# Patient Record
Sex: FEMALE | Race: BLACK OR AFRICAN AMERICAN | NOT HISPANIC OR LATINO | Employment: UNEMPLOYED | URBAN - METROPOLITAN AREA
[De-identification: names, ages, dates, MRNs, and addresses within clinical notes are randomized per-mention and may not be internally consistent; named-entity substitution may affect disease eponyms.]

---

## 2017-10-29 ENCOUNTER — HOSPITAL ENCOUNTER (EMERGENCY)
Facility: HOSPITAL | Age: 37
Discharge: HOME | End: 2017-10-29
Attending: EMERGENCY MEDICINE
Payer: MEDICAID

## 2017-10-29 VITALS
WEIGHT: 190 LBS | TEMPERATURE: 98 F | HEIGHT: 60 IN | OXYGEN SATURATION: 99 % | RESPIRATION RATE: 20 BRPM | BODY MASS INDEX: 37.3 KG/M2 | HEART RATE: 74 BPM | DIASTOLIC BLOOD PRESSURE: 95 MMHG | SYSTOLIC BLOOD PRESSURE: 144 MMHG

## 2017-10-29 DIAGNOSIS — J02.9 SORE THROAT: Primary | ICD-10-CM

## 2017-10-29 DIAGNOSIS — H61.23 BILATERAL IMPACTED CERUMEN: ICD-10-CM

## 2017-10-29 PROCEDURE — 99283 EMERGENCY DEPT VISIT LOW MDM: CPT

## 2017-10-29 NOTE — ED PROVIDER NOTES
"SCRIBE #1 NOTE: I, Annette Wade, am scribing for, and in the presence of, Linda Jean MD. I have scribed the entire note.      History      Chief Complaint   Patient presents with    Sore Throat     Pt states, "I have a really bad sore throat and I can't swallow and it's making my ears hurt."       Review of patient's allergies indicates:  No Known Allergies     HPI   HPI    10/29/2017, 6:55 AM   History obtained from the patient      History of Present Illness: Mario Alberto Rader is a 37 y.o. female patient who presents to the Emergency Department for sore throat which onset gradually yesterday. Symptoms are constant and moderate in severity. No mitigating or exacerbating factors reported. No other associated sxs reported. Patient denies any fever, chills, n/v/d, rhinorrhea, congestion, otalgia, difficulty swallowing, sinus pressure, voice change, and all other sxs at this time. Prior Tx includes Ibuprofen and BC powder with no relief. No further complaints or concerns at this time.        Arrival mode: Personal vehicle      PCP: Coreen Woodard MD       Past Medical History:  History reviewed. No pertinent medical history.    Past Surgical History:  Past Surgical History:   Procedure Laterality Date    TUBAL LIGATION  2005         Family History:  Family History   Problem Relation Age of Onset    Hypertension Maternal Grandmother     Hypertension Mother     Migraines Mother     Hypertension Father     Diabetes Father     Rheum arthritis Father     Mental illness Sister      schizophrenia       Social History:  Social History     Social History Main Topics    Smoking status: Current Some Day Smoker     Packs/day: 0.00    Smokeless tobacco: unknown      Comment: social smoker - 1 only.  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    Alcohol use 1.8 oz/week     3 Glasses of wine per week    Drug use: Unknown    Sexual activity: unknown       ROS   Review of Systems   Constitutional: Negative " for chills and fever.   HENT: Positive for sore throat. Negative for congestion, ear pain, rhinorrhea, sinus pressure, trouble swallowing and voice change.    Respiratory: Negative for shortness of breath.    Cardiovascular: Negative for chest pain.   Gastrointestinal: Negative for diarrhea, nausea and vomiting.   Genitourinary: Negative for dysuria.   Musculoskeletal: Negative for back pain.   Skin: Negative for rash.   Neurological: Negative for weakness.   Hematological: Does not bruise/bleed easily.   All other systems reviewed and are negative.    Physical Exam      Initial Vitals   BP Pulse Resp Temp SpO2   -- -- -- -- --      MAP       --          Physical Exam  Nursing Notes and Vital Signs Reviewed.  Constitutional: Patient is in no acute distress. Well-developed and well-nourished.  Head: Atraumatic. Normocephalic.  Eyes: PERRL. EOM intact. Conjunctivae are not pale. No scleral icterus.  Ears: Cerumen in bilateral ears. Right TM normal. Left TM normal. No erythema. No bulging. No effusion or air-fluid levels. No perforation.   Nose: Patent nares. Turbinates are normal. No drainage.    Throat: Moist mucous membranes. Posterior oropharynx is symmetric without erythema. Tonsillar exudate is not present. No trismus. Normal handling of secretions. No stridor.  Neck: Supple. Full ROM. No lymphadenopathy.  Cardiovascular: Regular rate. Regular rhythm. No murmurs, rubs, or gallops. Distal pulses are 2+ and symmetric.  Pulmonary/Chest: No respiratory distress. Clear to auscultation bilaterally. No wheezing, rales, or rhonchi.  Abdominal: Soft and non-distended.  There is no tenderness.    Musculoskeletal: Moves all extremities. No obvious deformities. No edema.   Skin: Warm and dry.  Neurological:  Alert, awake, and appropriate.  Normal speech.  No acute focal neurological deficits are appreciated.  Psychiatric: Normal affect. Good eye contact. Appropriate in content.    ED Course    Procedures  ED Vital  Signs:  Vitals:    10/29/17 0655   BP: (!) 144/95   Pulse: 74   Resp: 20   Temp: 98 °F (36.7 °C)   TempSrc: Oral   SpO2: 99%   Weight: 86.2 kg (190 lb)   Height: 5' (1.524 m)                The Emergency Provider reviewed the vital signs and test results, which are outlined above.    ED Discussion     6:55 AM: Initial assessment of pt.  Pt is awake, alert, and in NAD at this time. Discussed with pt all pertinent ED information. Discussed pt dx and plan of tx. Gave pt all f/u and return to the ED instructions. All questions and concerns were addressed at this time. Pt expresses understanding of information and instructions, and is comfortable with plan to discharge. Pt is stable for discharge.      ED Medication(s):  Medications - No data to display    There are no discharge medications for this patient.      Follow-up Information     Coreen Woodard MD. Schedule an appointment as soon as possible for a visit in 2 days.    Specialty:  Internal Medicine  Why:  Return to the Emergency Room, If symptoms worsen  Contact information:  [address] [place] [id number]  [phone]                     Medical Decision Making              Scribe Attestation:   Scribe #1: I performed the above scribed service and the documentation accurately describes the services I performed. I attest to the accuracy of the note.    Attending:   Physician Attestation Statement for Scribe #1: I, Linda Jean MD, personally performed the services described in this documentation, as scribed by Annette Wade, in my presence, and it is both accurate and complete.          Clinical Impression       ICD-10-CM ICD-9-CM   1. Sore throat J02.9 462   2. Bilateral impacted cerumen H61.23 380.4       Disposition:   Disposition: Discharged  Condition: Stable         Linda Jean MD  10/29/17 0849

## 2017-12-26 ENCOUNTER — NURSE TRIAGE (OUTPATIENT)
Dept: ADMINISTRATIVE | Facility: CLINIC | Age: 37
End: 2017-12-26

## 2017-12-26 NOTE — TELEPHONE ENCOUNTER
Reason for Disposition   Vomiting and abdomen looks much more swollen than usual    Protocols used: ST ABDOMINAL PAIN - FEMALE-A-OH    Ms. Rader states she has abdominal pain with eating and drinking only that began 4 days ago. She states her abdomen in swollen, patient vomited yesterday. Patient advised to go to ED.

## 2020-12-10 ENCOUNTER — TELEPHONE (OUTPATIENT)
Dept: INTERNAL MEDICINE | Facility: CLINIC | Age: 40
End: 2020-12-10

## 2020-12-10 ENCOUNTER — TELEPHONE (OUTPATIENT)
Dept: PALLIATIVE MEDICINE | Facility: CLINIC | Age: 40
End: 2020-12-10

## 2020-12-10 NOTE — TELEPHONE ENCOUNTER
----- Message from Wenceslao Ding sent at 12/10/2020  9:24 AM CST -----  Hello,     The following patient is requesting an appt with Dr. Bowles due to abdominal pain. Explained to the pt she'll be considered a new pt since she was last seen in 2016 and no showed for appt in 2018. If possible, please contact the pt with an appt.     Thank you

## 2020-12-11 ENCOUNTER — OFFICE VISIT (OUTPATIENT)
Dept: INTERNAL MEDICINE | Facility: CLINIC | Age: 40
End: 2020-12-11
Payer: MEDICAID

## 2020-12-11 VITALS
SYSTOLIC BLOOD PRESSURE: 145 MMHG | DIASTOLIC BLOOD PRESSURE: 90 MMHG | HEIGHT: 60 IN | OXYGEN SATURATION: 99 % | WEIGHT: 180.75 LBS | HEART RATE: 87 BPM | BODY MASS INDEX: 35.49 KG/M2

## 2020-12-11 DIAGNOSIS — K21.9 GASTROESOPHAGEAL REFLUX DISEASE, UNSPECIFIED WHETHER ESOPHAGITIS PRESENT: Primary | ICD-10-CM

## 2020-12-11 PROCEDURE — 99203 PR OFFICE/OUTPT VISIT, NEW, LEVL III, 30-44 MIN: ICD-10-PCS | Mod: S$PBB,,, | Performed by: STUDENT IN AN ORGANIZED HEALTH CARE EDUCATION/TRAINING PROGRAM

## 2020-12-11 PROCEDURE — 99203 OFFICE O/P NEW LOW 30 MIN: CPT | Mod: S$PBB,,, | Performed by: STUDENT IN AN ORGANIZED HEALTH CARE EDUCATION/TRAINING PROGRAM

## 2020-12-11 PROCEDURE — 99999 PR PBB SHADOW E&M-EST. PATIENT-LVL III: CPT | Mod: PBBFAC,,, | Performed by: STUDENT IN AN ORGANIZED HEALTH CARE EDUCATION/TRAINING PROGRAM

## 2020-12-11 PROCEDURE — 99999 PR PBB SHADOW E&M-EST. PATIENT-LVL III: ICD-10-PCS | Mod: PBBFAC,,, | Performed by: STUDENT IN AN ORGANIZED HEALTH CARE EDUCATION/TRAINING PROGRAM

## 2020-12-11 PROCEDURE — 99213 OFFICE O/P EST LOW 20 MIN: CPT | Mod: PBBFAC | Performed by: STUDENT IN AN ORGANIZED HEALTH CARE EDUCATION/TRAINING PROGRAM

## 2020-12-11 RX ORDER — PANTOPRAZOLE SODIUM 40 MG/1
40 TABLET, DELAYED RELEASE ORAL DAILY
Qty: 43 TABLET | Refills: 0 | Status: SHIPPED | OUTPATIENT
Start: 2020-12-11 | End: 2021-05-06

## 2020-12-11 NOTE — PROGRESS NOTES
Internal Medicine Clinic Note  2020    Subjective   Patient Name: Mario Alberto Rader  : 1980    Chief Complaint:   Chief Complaint   Patient presents with    Nausea    Gastroesophageal Reflux    Emesis    Back Pain       History of Present Illness:  Ms. Mario Alberto Rader is a 40 y.o. female who presents with acid reflux, N/V, back pain. She reports 1 week of burning, tight, epigastric pain and severe acid reflux, making it hard to lie down or sleep. She has vomited x2, the last a couple days ago and noted a small amount of blood, although she also reports epistaxis then and believes it could be attributed to that as well. It is worse when she has not eaten and also when she eats certain foods, including spicy foods and EtOH, which she has tried to eliminate from her diet. It is associated with mid-left back pain/ muscle tightness, which she uses ibuprofen for. Marijuana helps. Midol helps bloating. She reports fluctuating constipation and diarrhea and tries to use benefiber, although sometimes causes diarrhea. She reports similar episode last year lasting 1 week, worse at that time as she was unable to eat. She did not use any medications for it and eventually subsided. She has tried Zantac, yonis seltzer, pepto-bismol, TUMS without improvement. Denies fever, chills.    Review of Systems   Constitutional: Negative for chills and fever.   HENT: Negative for sore throat.    Respiratory: Negative for cough and shortness of breath.    Cardiovascular: Negative for chest pain and leg swelling.   Gastrointestinal: Positive for abdominal pain, constipation, diarrhea, heartburn, nausea and vomiting.   Genitourinary: Negative for dysuria.   Musculoskeletal: Positive for back pain.   Skin: Negative for rash.   Neurological: Negative for dizziness and headaches.       PAST HISTORY:     No past medical history on file.    Past Surgical History:   Procedure Laterality Date    TUBAL LIGATION         Family History    Problem Relation Age of Onset    Hypertension Maternal Grandmother     Hypertension Mother     Migraines Mother     Hypertension Father     Diabetes Father     Rheum arthritis Father     Mental illness Sister         schizophrenia       Social History     Socioeconomic History    Marital status: Legally      Spouse name: Not on file    Number of children: Not on file    Years of education: Not on file    Highest education level: Not on file   Occupational History    Not on file   Social Needs    Financial resource strain: Not on file    Food insecurity     Worry: Not on file     Inability: Not on file    Transportation needs     Medical: Not on file     Non-medical: Not on file   Tobacco Use    Smoking status: Current Some Day Smoker     Packs/day: 0.00    Tobacco comment: social smoker - 1 only.  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   Substance and Sexual Activity    Alcohol use: Yes     Alcohol/week: 3.0 standard drinks     Types: 3 Glasses of wine per week    Drug use: Not on file    Sexual activity: Not on file   Lifestyle    Physical activity     Days per week: Not on file     Minutes per session: Not on file    Stress: Not on file   Relationships    Social connections     Talks on phone: Not on file     Gets together: Not on file     Attends Restoration service: Not on file     Active member of club or organization: Not on file     Attends meetings of clubs or organizations: Not on file     Relationship status: Not on file   Other Topics Concern    Not on file   Social History Narrative    Not on file       MEDICATIONS & ALLERGIES:       Current Outpatient Medications:     pantoprazole (PROTONIX) 40 MG tablet, Take 1 tablet (40 mg total) by mouth once daily., Disp: 43 tablet, Rfl: 0    Review of patient's allergies indicates:  No Known Allergies    OBJECTIVE:     Vital Signs:  Vitals:    12/11/20 1408   BP: (!) 145/90   BP Location: Right arm   Patient Position:  Sitting   BP Method: Large (Manual)   Pulse: 87   SpO2: 99%   Weight: 82 kg (180 lb 12.4 oz)   Height: 5' (1.524 m)       Body mass index is 35.31 kg/m².     Physical Exam  Constitutional:       Appearance: Normal appearance.   HENT:      Head: Normocephalic and atraumatic.      Mouth/Throat:      Mouth: Mucous membranes are moist.      Pharynx: Oropharynx is clear.   Eyes:      Extraocular Movements: Extraocular movements intact.      Pupils: Pupils are equal, round, and reactive to light.   Neck:      Musculoskeletal: Neck supple.   Cardiovascular:      Rate and Rhythm: Normal rate and regular rhythm.      Pulses: Normal pulses.      Heart sounds: Normal heart sounds.   Pulmonary:      Effort: Pulmonary effort is normal. No respiratory distress.      Breath sounds: Normal breath sounds.   Abdominal:      General: Abdomen is flat. Bowel sounds are normal. There is no distension.      Palpations: Abdomen is soft. There is no mass.      Tenderness: There is no guarding or rebound.      Comments: Mild tenderness to epigastrum and LUQ   Musculoskeletal:         General: No swelling.   Skin:     General: Skin is warm and dry.      Capillary Refill: Capillary refill takes less than 2 seconds.   Neurological:      General: No focal deficit present.      Mental Status: She is alert and oriented to person, place, and time. Mental status is at baseline.   Psychiatric:         Mood and Affect: Mood normal.         Behavior: Behavior normal.         Thought Content: Thought content normal.         Judgment: Judgment normal.         Laboratory  Lab Results   Component Value Date    WBC 3.67 (L) 03/14/2016    HGB 13.8 03/14/2016    HCT 40.2 03/14/2016    MCV 98 03/14/2016     03/14/2016     BMP  Lab Results   Component Value Date     03/14/2016    K 3.6 03/14/2016     03/14/2016    CO2 26 03/14/2016    BUN 7 03/14/2016    CREATININE 0.9 03/14/2016    CALCIUM 9.2 03/14/2016    ANIONGAP 10 03/14/2016     ESTGFRAFRICA >60.0 03/14/2016    EGFRNONAA >60.0 03/14/2016     No results found for: INR, PROTIME  Lab Results   Component Value Date    HGBA1C 4.5 03/14/2016     No results for input(s): POCTGLUCOSE in the last 72 hours.    Health Maintenance Due   Topic Date Due    Hepatitis C Screening  1980    HIV Screening  01/21/1997    TETANUS VACCINE  01/21/2000    Pneumococcal Vaccine (Medium Risk) (1 of 1 - PPSV23) 01/21/2001    Cervical Cancer Screening  03/14/2019    Influenza Vaccine (1) 08/01/2020       ASSESSMENT & PLAN:   Esther was seen today in clinic for acid reflux, N/V, back pain    Gastroesophageal reflux disease, unspecified whether esophagitis present  -- 1 week of burning, tight, epigastric pain and severe acid reflux, making it hard to lie down or sleep  -- vomited x2 and noted a small amount of blood, although she also reports epistaxis. Possibly epistaxis vs Ines Joy tear  -- Worse when she has not eaten and also when she eats certain foods, including spicy foods and EtOH, which she has tried to eliminate from her diet.  -- Counseled on food triggers and trying to determine which foods she should avoid  -- Using ibuprofen for associated back pain. Advised to avoid NSAIDs and use acetaminophen PRN pain  -- Fluctuating constipation and diarrhea and tries to use benefiber, although sometimes causes diarrhea. Discussed modifying dose to regulate bowels and avoid diarrhea  -- Similar episode last year lasting 1 week, eventually subsided.  -- pantoprazole 40 mg daily for 6 weeks  -- follow up if persists/worsens    RTC as needed.    Discussed with Dr. Craig  - staff attestation to follow    Nery Jones DO  Internal Medicine, PGY-1  [facility]  [address] [place] [id number] [phone]

## 2020-12-11 NOTE — PATIENT INSTRUCTIONS
-- pantoprazole daily for 6 weeks  -- stop ibuprofen  -- use acetaminophen over the counter for back pain  -- benefiber or metamucil for bowel regularity, may need to adjust dose to avoid recurrence of diarrhea  -- follow up if persists/worsens

## 2021-05-06 ENCOUNTER — HOSPITAL ENCOUNTER (EMERGENCY)
Facility: HOSPITAL | Age: 41
Discharge: HOME | End: 2021-05-06
Attending: EMERGENCY MEDICINE
Payer: MEDICAID

## 2021-05-06 VITALS
HEIGHT: 60 IN | SYSTOLIC BLOOD PRESSURE: 158 MMHG | BODY MASS INDEX: 35.3 KG/M2 | DIASTOLIC BLOOD PRESSURE: 92 MMHG | HEART RATE: 81 BPM | OXYGEN SATURATION: 97 % | RESPIRATION RATE: 16 BRPM | WEIGHT: 179.81 LBS | TEMPERATURE: 99 F

## 2021-05-06 DIAGNOSIS — R03.0 ELEVATED BLOOD PRESSURE READING: ICD-10-CM

## 2021-05-06 DIAGNOSIS — R07.9 CHEST PAIN: ICD-10-CM

## 2021-05-06 DIAGNOSIS — R05.9 COUGH: Primary | ICD-10-CM

## 2021-05-06 LAB
ALBUMIN SERPL BCP-MCNC: 4.5 G/DL (ref 3.5–5.2)
ALP SERPL-CCNC: 48 U/L (ref 55–135)
ALT SERPL W/O P-5'-P-CCNC: 25 U/L (ref 10–44)
ANION GAP SERPL CALC-SCNC: 8 MMOL/L (ref 8–16)
AST SERPL-CCNC: 27 U/L (ref 10–40)
BASOPHILS # BLD AUTO: 0.02 K/UL (ref 0–0.2)
BASOPHILS NFR BLD: 0.4 % (ref 0–1.9)
BILIRUB SERPL-MCNC: 0.7 MG/DL (ref 0.1–1)
BNP SERPL-MCNC: 15 PG/ML (ref 0–99)
BUN SERPL-MCNC: 4 MG/DL (ref 6–20)
CALCIUM SERPL-MCNC: 9.2 MG/DL (ref 8.7–10.5)
CHLORIDE SERPL-SCNC: 106 MMOL/L (ref 95–110)
CO2 SERPL-SCNC: 24 MMOL/L (ref 23–29)
CREAT SERPL-MCNC: 0.8 MG/DL (ref 0.5–1.4)
CTP QC/QA: YES
CTP QC/QA: YES
DIFFERENTIAL METHOD: ABNORMAL
EOSINOPHIL # BLD AUTO: 0 K/UL (ref 0–0.5)
EOSINOPHIL NFR BLD: 0.6 % (ref 0–8)
ERYTHROCYTE [DISTWIDTH] IN BLOOD BY AUTOMATED COUNT: 10.8 % (ref 11.5–14.5)
EST. GFR  (AFRICAN AMERICAN): >60 ML/MIN/1.73 M^2
EST. GFR  (NON AFRICAN AMERICAN): >60 ML/MIN/1.73 M^2
GLUCOSE SERPL-MCNC: 103 MG/DL (ref 70–110)
HCT VFR BLD AUTO: 41.8 % (ref 37–48.5)
HCV AB SERPL QL IA: NEGATIVE
HGB BLD-MCNC: 13.9 G/DL (ref 12–16)
HIV 1+2 AB+HIV1 P24 AG SERPL QL IA: NEGATIVE
IMM GRANULOCYTES # BLD AUTO: 0.01 K/UL (ref 0–0.04)
IMM GRANULOCYTES NFR BLD AUTO: 0.2 % (ref 0–0.5)
LYMPHOCYTES # BLD AUTO: 1.3 K/UL (ref 1–4.8)
LYMPHOCYTES NFR BLD: 25.6 % (ref 18–48)
MCH RBC QN AUTO: 32.8 PG (ref 27–31)
MCHC RBC AUTO-ENTMCNC: 33.3 G/DL (ref 32–36)
MCV RBC AUTO: 99 FL (ref 82–98)
MONOCYTES # BLD AUTO: 0.7 K/UL (ref 0.3–1)
MONOCYTES NFR BLD: 13.9 % (ref 4–15)
NEUTROPHILS # BLD AUTO: 2.9 K/UL (ref 1.8–7.7)
NEUTROPHILS NFR BLD: 59.3 % (ref 38–73)
NRBC BLD-RTO: 0 /100 WBC
PLATELET # BLD AUTO: 306 K/UL (ref 150–450)
PMV BLD AUTO: 9.3 FL (ref 9.2–12.9)
POC MOLECULAR INFLUENZA A AGN: NEGATIVE
POC MOLECULAR INFLUENZA B AGN: NEGATIVE
POTASSIUM SERPL-SCNC: 3.9 MMOL/L (ref 3.5–5.1)
PROT SERPL-MCNC: 8 G/DL (ref 6–8.4)
RBC # BLD AUTO: 4.24 M/UL (ref 4–5.4)
SARS-COV-2 RDRP RESP QL NAA+PROBE: NEGATIVE
SODIUM SERPL-SCNC: 138 MMOL/L (ref 136–145)
TROPONIN I SERPL DL<=0.01 NG/ML-MCNC: <0.006 NG/ML (ref 0–0.03)
WBC # BLD AUTO: 4.96 K/UL (ref 3.9–12.7)

## 2021-05-06 PROCEDURE — 86803 HEPATITIS C AB TEST: CPT | Performed by: EMERGENCY MEDICINE

## 2021-05-06 PROCEDURE — 80053 COMPREHEN METABOLIC PANEL: CPT | Performed by: NURSE PRACTITIONER

## 2021-05-06 PROCEDURE — 93010 EKG 12-LEAD: ICD-10-PCS | Mod: ,,, | Performed by: INTERNAL MEDICINE

## 2021-05-06 PROCEDURE — U0002 COVID-19 LAB TEST NON-CDC: HCPCS | Performed by: NURSE PRACTITIONER

## 2021-05-06 PROCEDURE — 36415 COLL VENOUS BLD VENIPUNCTURE: CPT | Performed by: EMERGENCY MEDICINE

## 2021-05-06 PROCEDURE — 93010 ELECTROCARDIOGRAM REPORT: CPT | Mod: ,,, | Performed by: INTERNAL MEDICINE

## 2021-05-06 PROCEDURE — 99285 EMERGENCY DEPT VISIT HI MDM: CPT | Mod: 25

## 2021-05-06 PROCEDURE — 93005 ELECTROCARDIOGRAM TRACING: CPT

## 2021-05-06 PROCEDURE — 86703 HIV-1/HIV-2 1 RESULT ANTBDY: CPT | Performed by: EMERGENCY MEDICINE

## 2021-05-06 PROCEDURE — 83880 ASSAY OF NATRIURETIC PEPTIDE: CPT | Performed by: NURSE PRACTITIONER

## 2021-05-06 PROCEDURE — 25000003 PHARM REV CODE 250: Performed by: NURSE PRACTITIONER

## 2021-05-06 PROCEDURE — 84484 ASSAY OF TROPONIN QUANT: CPT | Performed by: NURSE PRACTITIONER

## 2021-05-06 PROCEDURE — 85025 COMPLETE CBC W/AUTO DIFF WBC: CPT | Performed by: NURSE PRACTITIONER

## 2021-05-06 RX ORDER — CLONIDINE HYDROCHLORIDE 0.1 MG/1
0.1 TABLET ORAL
Status: COMPLETED | OUTPATIENT
Start: 2021-05-06 | End: 2021-05-06

## 2021-05-06 RX ORDER — AZITHROMYCIN 250 MG/1
250 TABLET, FILM COATED ORAL DAILY
Qty: 6 TABLET | Refills: 0 | Status: SHIPPED | OUTPATIENT
Start: 2021-05-06 | End: 2021-06-09

## 2021-05-06 RX ORDER — ACETAMINOPHEN/PYRILAMINE/CAFF 500-15-60
TABLET ORAL
COMMUNITY
End: 2024-03-26

## 2021-05-06 RX ADMIN — CLONIDINE HYDROCHLORIDE 0.1 MG: 0.1 TABLET ORAL at 12:05

## 2021-05-24 ENCOUNTER — LAB VISIT (OUTPATIENT)
Dept: INTERNAL MEDICINE | Facility: CLINIC | Age: 41
End: 2021-05-24
Payer: MEDICAID

## 2021-05-24 ENCOUNTER — OFFICE VISIT (OUTPATIENT)
Dept: INTERNAL MEDICINE | Facility: CLINIC | Age: 41
End: 2021-05-24
Payer: MEDICAID

## 2021-05-24 DIAGNOSIS — R10.9 ABDOMINAL PAIN, UNSPECIFIED ABDOMINAL LOCATION: ICD-10-CM

## 2021-05-24 DIAGNOSIS — K21.9 GASTROESOPHAGEAL REFLUX DISEASE, UNSPECIFIED WHETHER ESOPHAGITIS PRESENT: ICD-10-CM

## 2021-05-24 DIAGNOSIS — R05.9 COUGH: Primary | ICD-10-CM

## 2021-05-24 DIAGNOSIS — R05.9 COUGH: ICD-10-CM

## 2021-05-24 PROCEDURE — U0003 INFECTIOUS AGENT DETECTION BY NUCLEIC ACID (DNA OR RNA); SEVERE ACUTE RESPIRATORY SYNDROME CORONAVIRUS 2 (SARS-COV-2) (CORONAVIRUS DISEASE [COVID-19]), AMPLIFIED PROBE TECHNIQUE, MAKING USE OF HIGH THROUGHPUT TECHNOLOGIES AS DESCRIBED BY CMS-2020-01-R: HCPCS | Performed by: STUDENT IN AN ORGANIZED HEALTH CARE EDUCATION/TRAINING PROGRAM

## 2021-05-24 PROCEDURE — 99999 PR PBB SHADOW E&M-EST. PATIENT-LVL II: ICD-10-PCS | Mod: PBBFAC,,, | Performed by: STUDENT IN AN ORGANIZED HEALTH CARE EDUCATION/TRAINING PROGRAM

## 2021-05-24 PROCEDURE — 99213 PR OFFICE/OUTPT VISIT, EST, LEVL III, 20-29 MIN: ICD-10-PCS | Mod: S$PBB,,, | Performed by: STUDENT IN AN ORGANIZED HEALTH CARE EDUCATION/TRAINING PROGRAM

## 2021-05-24 PROCEDURE — 99213 OFFICE O/P EST LOW 20 MIN: CPT | Mod: S$PBB,,, | Performed by: STUDENT IN AN ORGANIZED HEALTH CARE EDUCATION/TRAINING PROGRAM

## 2021-05-24 PROCEDURE — U0005 INFEC AGEN DETEC AMPLI PROBE: HCPCS | Performed by: STUDENT IN AN ORGANIZED HEALTH CARE EDUCATION/TRAINING PROGRAM

## 2021-05-24 PROCEDURE — 99999 PR PBB SHADOW E&M-EST. PATIENT-LVL II: CPT | Mod: PBBFAC,,, | Performed by: STUDENT IN AN ORGANIZED HEALTH CARE EDUCATION/TRAINING PROGRAM

## 2021-05-24 PROCEDURE — 99212 OFFICE O/P EST SF 10 MIN: CPT | Mod: PBBFAC | Performed by: STUDENT IN AN ORGANIZED HEALTH CARE EDUCATION/TRAINING PROGRAM

## 2021-05-24 RX ORDER — LORATADINE 10 MG/1
10 TABLET ORAL DAILY
Qty: 10 TABLET | Refills: 0 | Status: SHIPPED | OUTPATIENT
Start: 2021-05-24 | End: 2024-03-26

## 2021-05-24 RX ORDER — FLUTICASONE PROPIONATE 50 MCG
1 SPRAY, SUSPENSION (ML) NASAL DAILY
Qty: 5 ML | Refills: 0 | Status: SHIPPED | OUTPATIENT
Start: 2021-05-24 | End: 2021-06-02

## 2021-05-24 RX ORDER — PANTOPRAZOLE SODIUM 20 MG/1
20 TABLET, DELAYED RELEASE ORAL DAILY
Qty: 30 TABLET | Refills: 2 | Status: SHIPPED | OUTPATIENT
Start: 2021-05-24 | End: 2022-07-20 | Stop reason: SDUPTHER

## 2021-05-25 LAB — SARS-COV-2 RNA RESP QL NAA+PROBE: NOT DETECTED

## 2021-06-08 ENCOUNTER — NURSE TRIAGE (OUTPATIENT)
Dept: ADMINISTRATIVE | Facility: CLINIC | Age: 41
End: 2021-06-08

## 2021-06-08 ENCOUNTER — TELEPHONE (OUTPATIENT)
Dept: INTERNAL MEDICINE | Facility: CLINIC | Age: 41
End: 2021-06-08

## 2021-06-09 ENCOUNTER — OFFICE VISIT (OUTPATIENT)
Dept: INTERNAL MEDICINE | Facility: CLINIC | Age: 41
End: 2021-06-09
Payer: MEDICAID

## 2021-06-09 VITALS
HEIGHT: 60 IN | HEART RATE: 85 BPM | SYSTOLIC BLOOD PRESSURE: 134 MMHG | OXYGEN SATURATION: 99 % | BODY MASS INDEX: 35.39 KG/M2 | WEIGHT: 180.25 LBS | DIASTOLIC BLOOD PRESSURE: 72 MMHG

## 2021-06-09 DIAGNOSIS — R21 RASH AND NONSPECIFIC SKIN ERUPTION: Primary | ICD-10-CM

## 2021-06-09 DIAGNOSIS — Z23 NEED FOR TDAP VACCINATION: ICD-10-CM

## 2021-06-09 PROCEDURE — 99999 PR PBB SHADOW E&M-EST. PATIENT-LVL III: ICD-10-PCS | Mod: PBBFAC,,, | Performed by: PHYSICIAN ASSISTANT

## 2021-06-09 PROCEDURE — 87529 HSV DNA AMP PROBE: CPT | Performed by: PHYSICIAN ASSISTANT

## 2021-06-09 PROCEDURE — 99213 OFFICE O/P EST LOW 20 MIN: CPT | Mod: PBBFAC | Performed by: PHYSICIAN ASSISTANT

## 2021-06-09 PROCEDURE — 99999 PR PBB SHADOW E&M-EST. PATIENT-LVL III: CPT | Mod: PBBFAC,,, | Performed by: PHYSICIAN ASSISTANT

## 2021-06-09 PROCEDURE — 99213 OFFICE O/P EST LOW 20 MIN: CPT | Mod: S$PBB,,, | Performed by: PHYSICIAN ASSISTANT

## 2021-06-09 PROCEDURE — 99213 PR OFFICE/OUTPT VISIT, EST, LEVL III, 20-29 MIN: ICD-10-PCS | Mod: S$PBB,,, | Performed by: PHYSICIAN ASSISTANT

## 2021-06-09 RX ORDER — VALACYCLOVIR HYDROCHLORIDE 1 G/1
1000 TABLET, FILM COATED ORAL EVERY 12 HOURS
Qty: 20 TABLET | Refills: 1 | Status: SHIPPED | OUTPATIENT
Start: 2021-06-09 | End: 2024-03-26

## 2021-06-11 LAB
HSV1 DNA SPEC QL NAA+PROBE: NEGATIVE
HSV2 DNA SPEC QL NAA+PROBE: POSITIVE
SPECIMEN SOURCE: ABNORMAL

## 2021-06-14 ENCOUNTER — TELEPHONE (OUTPATIENT)
Dept: INTERNAL MEDICINE | Facility: CLINIC | Age: 41
End: 2021-06-14

## 2021-06-14 DIAGNOSIS — B00.9 HERPES SIMPLEX: ICD-10-CM

## 2021-07-13 ENCOUNTER — PATIENT OUTREACH (OUTPATIENT)
Dept: ADMINISTRATIVE | Facility: HOSPITAL | Age: 41
End: 2021-07-13

## 2021-07-30 ENCOUNTER — NURSE TRIAGE (OUTPATIENT)
Dept: ADMINISTRATIVE | Facility: CLINIC | Age: 41
End: 2021-07-30

## 2022-04-13 ENCOUNTER — HOSPITAL ENCOUNTER (EMERGENCY)
Facility: HOSPITAL | Age: 42
Discharge: HOME | End: 2022-04-13
Attending: EMERGENCY MEDICINE
Payer: MEDICAID

## 2022-04-13 VITALS
BODY MASS INDEX: 34.66 KG/M2 | HEART RATE: 89 BPM | OXYGEN SATURATION: 99 % | TEMPERATURE: 98 F | SYSTOLIC BLOOD PRESSURE: 191 MMHG | DIASTOLIC BLOOD PRESSURE: 106 MMHG | WEIGHT: 177.5 LBS | RESPIRATION RATE: 18 BRPM

## 2022-04-13 DIAGNOSIS — J32.9 SINUSITIS, UNSPECIFIED CHRONICITY, UNSPECIFIED LOCATION: Primary | ICD-10-CM

## 2022-04-13 LAB
CTP QC/QA: YES
CTP QC/QA: YES
POC MOLECULAR INFLUENZA A AGN: NEGATIVE
POC MOLECULAR INFLUENZA B AGN: NEGATIVE
SARS-COV-2 RDRP RESP QL NAA+PROBE: NEGATIVE

## 2022-04-13 PROCEDURE — 99283 EMERGENCY DEPT VISIT LOW MDM: CPT | Mod: 25

## 2022-04-13 PROCEDURE — 87502 INFLUENZA DNA AMP PROBE: CPT

## 2022-04-13 PROCEDURE — U0002 COVID-19 LAB TEST NON-CDC: HCPCS | Performed by: NURSE PRACTITIONER

## 2022-04-13 RX ORDER — AMOXICILLIN AND CLAVULANATE POTASSIUM 875; 125 MG/1; MG/1
1 TABLET, FILM COATED ORAL 2 TIMES DAILY
Qty: 14 TABLET | Refills: 0 | Status: SHIPPED | OUTPATIENT
Start: 2022-04-13 | End: 2022-04-20

## 2022-04-13 NOTE — ED PROVIDER NOTES
Encounter Date: 4/13/2022       History     Chief Complaint   Patient presents with    Sinusitis     Started a week ago. Took allergy relief meds. Now having a green discharge. Pt with known hypertension not taking meds     Presents to ER for nasal congestion, onset approximately 1 week ago.  She denies associated symptoms.  She has tried over-the-counter allergy medication without relief.  Symptoms have been constant since onset.  She denies fever, chills, generalized body aches, chest pain, shortness of breath.  To note, patient with elevated blood pressure reading, she reports history of hypertension states she does not take her blood pressure medication as prescribed.  Patient asymptomatic hypertension.    The history is provided by the patient.     Review of patient's allergies indicates:  No Known Allergies  Past Medical History:   Diagnosis Date    Asthma     GERD (gastroesophageal reflux disease)     Hypertension      Past Surgical History:   Procedure Laterality Date    TUBAL LIGATION  2005     Family History   Problem Relation Age of Onset    Hypertension Maternal Grandmother     Hypertension Mother     Migraines Mother     Hypertension Father     Diabetes Father     Rheum arthritis Father     Mental illness Sister         schizophrenia     Social History     Tobacco Use    Smoking status: Former Smoker     Packs/day: 0.00    Smokeless tobacco: Never Used    Tobacco comment: social smoker - 1 only.  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   Substance Use Topics    Alcohol use: Yes     Alcohol/week: 3.0 standard drinks     Types: 3 Glasses of wine per week     Comment: occ    Drug use: Not Currently     Types: Marijuana     Review of Systems   Constitutional: Negative for chills, fatigue and fever.   HENT: Positive for rhinorrhea and sinus pain. Negative for ear pain, sinus pressure and sore throat.    Eyes: Negative for pain.   Respiratory: Negative for cough and shortness of  breath.    Cardiovascular: Negative for chest pain and palpitations.   Gastrointestinal: Negative for abdominal pain, nausea and vomiting.   Genitourinary: Negative for dysuria.   Musculoskeletal: Negative for back pain, myalgias and neck pain.   Skin: Negative for rash.   Neurological: Negative for dizziness, syncope, weakness, light-headedness, numbness and headaches.   All other systems reviewed and are negative.      Physical Exam     Initial Vitals [04/13/22 1105]   BP Pulse Resp Temp SpO2   (!) 210/107 89 18 98.2 °F (36.8 °C) 99 %      MAP       --         Physical Exam    Nursing note and vitals reviewed.  Constitutional: She appears well-developed and well-nourished. She is not diaphoretic. No distress.   HENT:   Head: Normocephalic and atraumatic.   Right Ear: External ear normal.   Left Ear: External ear normal.   Nose: Right sinus exhibits maxillary sinus tenderness. Left sinus exhibits no maxillary sinus tenderness.   Mouth/Throat: Oropharynx is clear and moist and mucous membranes are normal. No oropharyngeal exudate, posterior oropharyngeal edema or posterior oropharyngeal erythema.   Eyes: Conjunctivae and EOM are normal. Pupils are equal, round, and reactive to light.   Neck: Neck supple.   Normal range of motion.  Cardiovascular: Normal rate, regular rhythm and intact distal pulses.   Pulmonary/Chest: Breath sounds normal. No respiratory distress.   Abdominal: Abdomen is soft. There is no abdominal tenderness.   Musculoskeletal:         General: Normal range of motion.      Cervical back: Normal range of motion and neck supple.     Neurological: She is alert and oriented to person, place, and time. She has normal strength. No cranial nerve deficit or sensory deficit. GCS score is 15. GCS eye subscore is 4. GCS verbal subscore is 5. GCS motor subscore is 6.   Skin: Skin is warm and dry. Capillary refill takes less than 2 seconds.         ED Course   Procedures  Labs Reviewed   POCT INFLUENZA A/B  MOLECULAR    Narrative:     This test utilizes isothermal nucleic acid amplification   technology to detect the SARS-CoV-2 RdRp nucleic acid segment.   The analytical sensitivity (limit of detection) is 125 genome   equivalents/mL.   A POSITIVE result implies infection with the SARS-CoV-2 virus;   the patient is presumed to be contagious.     A NEGATIVE result means that SARS-CoV-2 nucleic acids are not   present above the limit of detection. A NEGATIVE result should be   treated as presumptive. It does not rule out the possibility of   COVID-19 and should not be the sole basis for treatment decisions.   If COVID-19 is strongly suspected based on clinical and exposure   history, re-testing using an alternate molecular assay should be   considered.   This test is only for use under the Food and Drug   Administration s Emergency Use Authorization (EUA).   Commercial kits are provided by [employer].   Performance characteristics of the EUA have been independently   verified by [facility] Department of   Pathology and Laboratory Medicine.   _________________________________________________________________   The authorized Fact Sheet for Healthcare Providers and the authorized Fact   Sheet for Patients of the ID NOW COVID-19 are available on the FDA   website:     https://www.fda.gov/media/081716/download  https://www.fda.gov/media/256450/download           SARS-COV-2 RDRP GENE    Narrative:     This test utilizes isothermal nucleic acid amplification   technology to detect the SARS-CoV-2 RdRp nucleic acid segment.   The analytical sensitivity (limit of detection) is 125 genome   equivalents/mL.   A POSITIVE result implies infection with the SARS-CoV-2 virus;   the patient is presumed to be contagious.     A NEGATIVE result means that SARS-CoV-2 nucleic acids are not   present above the limit of detection. A NEGATIVE result should be   treated as presumptive. It does not rule out the possibility of    COVID-19 and should not be the sole basis for treatment decisions.   If COVID-19 is strongly suspected based on clinical and exposure   history, re-testing using an alternate molecular assay should be   considered.   This test is only for use under the Food and Drug   Administration s Emergency Use Authorization (EUA).   Commercial kits are provided by [employer].   Performance characteristics of the EUA have been independently   verified by [facility] Department of   Pathology and Laboratory Medicine.   _________________________________________________________________   The authorized Fact Sheet for Healthcare Providers and the authorized Fact   Sheet for Patients of the ID NOW COVID-19 are available on the FDA   website:     https://www.fda.gov/media/109354/download  https://www.fda.gov/media/644267/download                 Results for orders placed or performed during the hospital encounter of 04/13/22   POCT Influenza A/B Molecular   Result Value Ref Range    POC Molecular Influenza A Ag Negative Negative, Not Reported    POC Molecular Influenza B Ag Negative Negative, Not Reported     Acceptable Yes    POCT COVID-19 Rapid Screening   Result Value Ref Range    POC Rapid COVID Negative Negative     Acceptable Yes          Imaging Results    None          Medications - No data to display                discussed all results with patient this time she verbalized understanding with no further questions or concerns.  Patient is asymptomatic of hypertension, she reports history of hypertension and states she is prescribed hydrochlorothiazide which she reports she does not take as prescribed.  Patient is asymptomatic for hypertension, she denies headache, numbness, tingling, visual changes, chest, shortness of breath, numbness, tingling.  Instructed patient on importance of taking medication as prescribed.  Instructed patient on close follow-up PCP regarding  hypertension.  Discussed signs symptoms to return to ER.  Patient verbalized understanding and states no further questions or concerns.  Patient states comfortable discharge home.  I discussed with patient  that evaluation in the ED does not suggest any emergent or life threatening medical conditions requiring immediate intervention beyond what was provided in the ED, and I believe patient is safe for discharge. Regardless, an unremarkable evaluation in the ED does not preclude the development or presence of a serious of life threatening condition. As such, patient was instructed to return immediately for any worsening or change in current symptoms.         Clinical Impression:   Final diagnoses:  [J32.9] Sinusitis, unspecified chronicity, unspecified location (Primary)          ED Disposition Condition    Discharge Stable        ED Prescriptions     Medication Sig Dispense Start Date End Date Auth. Provider    amoxicillin-clavulanate 875-125mg (AUGMENTIN) 875-125 mg per tablet Take 1 tablet by mouth 2 (two) times daily. for 7 days 14 tablet 4/13/2022 4/20/2022 Meet Rincon NP        Follow-up Information     Follow up With Specialties Details Why Contact Info    Follow up with your PCP in 1 day        [facility] Emergency Dept. Emergency Medicine  As needed, If symptoms worsen [id number] [facility] [id number] [phone]           Meet Rincon NP  04/16/22 [id number]

## 2022-04-13 NOTE — Clinical Note
"Esther Nicolea" Dior was seen and treated in our emergency department on 4/13/2022.  She may return to work on 04/16/2022.       If you have any questions or concerns, please don't hesitate to call.      Meet Rincon NP"

## 2022-05-16 ENCOUNTER — LAB VISIT (OUTPATIENT)
Dept: LAB | Facility: HOSPITAL | Age: 42
End: 2022-05-16
Payer: MEDICAID

## 2022-05-16 ENCOUNTER — OFFICE VISIT (OUTPATIENT)
Dept: INTERNAL MEDICINE | Facility: CLINIC | Age: 42
End: 2022-05-16
Payer: MEDICAID

## 2022-05-16 VITALS
WEIGHT: 182.13 LBS | HEIGHT: 60 IN | SYSTOLIC BLOOD PRESSURE: 136 MMHG | BODY MASS INDEX: 35.76 KG/M2 | HEART RATE: 65 BPM | OXYGEN SATURATION: 99 % | DIASTOLIC BLOOD PRESSURE: 94 MMHG

## 2022-05-16 DIAGNOSIS — I10 HYPERTENSION, UNSPECIFIED TYPE: Primary | ICD-10-CM

## 2022-05-16 DIAGNOSIS — I50.9 HEART FAILURE, UNSPECIFIED HF CHRONICITY, UNSPECIFIED HEART FAILURE TYPE: ICD-10-CM

## 2022-05-16 DIAGNOSIS — I10 HYPERTENSION, UNSPECIFIED TYPE: ICD-10-CM

## 2022-05-16 LAB
ALBUMIN SERPL BCP-MCNC: 4.2 G/DL (ref 3.5–5.2)
ALP SERPL-CCNC: 45 U/L (ref 55–135)
ALT SERPL W/O P-5'-P-CCNC: 46 U/L (ref 10–44)
ANION GAP SERPL CALC-SCNC: 8 MMOL/L (ref 8–16)
AST SERPL-CCNC: 20 U/L (ref 10–40)
BILIRUB SERPL-MCNC: 0.6 MG/DL (ref 0.1–1)
BNP SERPL-MCNC: <10 PG/ML (ref 0–99)
BUN SERPL-MCNC: 11 MG/DL (ref 6–20)
CALCIUM SERPL-MCNC: 9.1 MG/DL (ref 8.7–10.5)
CHLORIDE SERPL-SCNC: 105 MMOL/L (ref 95–110)
CHOLEST SERPL-MCNC: 203 MG/DL (ref 120–199)
CHOLEST/HDLC SERPL: 3.3 {RATIO} (ref 2–5)
CO2 SERPL-SCNC: 26 MMOL/L (ref 23–29)
CREAT SERPL-MCNC: 0.8 MG/DL (ref 0.5–1.4)
EST. GFR  (AFRICAN AMERICAN): >60 ML/MIN/1.73 M^2
EST. GFR  (NON AFRICAN AMERICAN): >60 ML/MIN/1.73 M^2
ESTIMATED AVG GLUCOSE: 82 MG/DL (ref 68–131)
GLUCOSE SERPL-MCNC: 82 MG/DL (ref 70–110)
HBA1C MFR BLD: 4.5 % (ref 4–5.6)
HDLC SERPL-MCNC: 61 MG/DL (ref 40–75)
HDLC SERPL: 30 % (ref 20–50)
LDLC SERPL CALC-MCNC: 92.8 MG/DL (ref 63–159)
NONHDLC SERPL-MCNC: 142 MG/DL
POTASSIUM SERPL-SCNC: 4.1 MMOL/L (ref 3.5–5.1)
PROT SERPL-MCNC: 7.4 G/DL (ref 6–8.4)
SODIUM SERPL-SCNC: 139 MMOL/L (ref 136–145)
TRIGL SERPL-MCNC: 246 MG/DL (ref 30–150)

## 2022-05-16 PROCEDURE — 80061 LIPID PANEL: CPT | Performed by: STUDENT IN AN ORGANIZED HEALTH CARE EDUCATION/TRAINING PROGRAM

## 2022-05-16 PROCEDURE — 36415 COLL VENOUS BLD VENIPUNCTURE: CPT | Performed by: STUDENT IN AN ORGANIZED HEALTH CARE EDUCATION/TRAINING PROGRAM

## 2022-05-16 PROCEDURE — 80053 COMPREHEN METABOLIC PANEL: CPT | Performed by: STUDENT IN AN ORGANIZED HEALTH CARE EDUCATION/TRAINING PROGRAM

## 2022-05-16 PROCEDURE — 99213 PR OFFICE/OUTPT VISIT, EST, LEVL III, 20-29 MIN: ICD-10-PCS | Mod: S$PBB,,, | Performed by: STUDENT IN AN ORGANIZED HEALTH CARE EDUCATION/TRAINING PROGRAM

## 2022-05-16 PROCEDURE — 83036 HEMOGLOBIN GLYCOSYLATED A1C: CPT | Performed by: STUDENT IN AN ORGANIZED HEALTH CARE EDUCATION/TRAINING PROGRAM

## 2022-05-16 PROCEDURE — 99999 PR PBB SHADOW E&M-EST. PATIENT-LVL III: CPT | Mod: PBBFAC,,, | Performed by: STUDENT IN AN ORGANIZED HEALTH CARE EDUCATION/TRAINING PROGRAM

## 2022-05-16 PROCEDURE — 99999 PR PBB SHADOW E&M-EST. PATIENT-LVL III: ICD-10-PCS | Mod: PBBFAC,,, | Performed by: STUDENT IN AN ORGANIZED HEALTH CARE EDUCATION/TRAINING PROGRAM

## 2022-05-16 PROCEDURE — 83880 ASSAY OF NATRIURETIC PEPTIDE: CPT | Performed by: STUDENT IN AN ORGANIZED HEALTH CARE EDUCATION/TRAINING PROGRAM

## 2022-05-16 PROCEDURE — 99213 OFFICE O/P EST LOW 20 MIN: CPT | Mod: PBBFAC | Performed by: STUDENT IN AN ORGANIZED HEALTH CARE EDUCATION/TRAINING PROGRAM

## 2022-05-16 PROCEDURE — 99213 OFFICE O/P EST LOW 20 MIN: CPT | Mod: S$PBB,,, | Performed by: STUDENT IN AN ORGANIZED HEALTH CARE EDUCATION/TRAINING PROGRAM

## 2022-05-16 RX ORDER — FUROSEMIDE 20 MG/1
20 TABLET ORAL DAILY
Qty: 60 TABLET | Refills: 0 | Status: SHIPPED | OUTPATIENT
Start: 2022-05-16 | End: 2022-07-20 | Stop reason: SDUPTHER

## 2022-05-16 NOTE — PROGRESS NOTES
Mario Alberto Rader  1980        Subjective     Chief Complaint:    History of Present Illness:  Ms. Mario Alberto Rader is a 42 y.o. female with PMHx of GERD, untreated HTN. Who present to the clinic with cc of LLE. She reports swelling in both legs that started couple of months and has been progressively getting worse. She also reports associated symptoms of HERNANDEZ and orthopnea that have been getting worse in the last couple of weeks and she has to sit up sometimes to catch her breath. Per chart review, she has been having high BP since 2015 with SBO in 140s-150s, however she never been treated for that. She was seen in ED around a months ago for sinuses problem and her SBP in 190 and was discharged with recs to f/u with her PCP, however she states she couldn't find sooner apt than today. She reports occasional exertional chest pain that relived with rest, lightheadedness, HA and vision changes.     Review of Systems   HENT: Negative for hearing loss.    Eyes: Negative for discharge.   Respiratory: Negative for shortness of breath and wheezing.    Cardiovascular: Positive for orthopnea and leg swelling. Negative for chest pain and palpitations.   Gastrointestinal: Negative for blood in stool, constipation, diarrhea and vomiting.   Genitourinary: Negative for dysuria and hematuria.   Musculoskeletal: Positive for neck pain.   Neurological: Positive for weakness and headaches.   Endo/Heme/Allergies: Negative for polydipsia.       PAST HISTORY:     Past Medical History:   Diagnosis Date    Asthma     GERD (gastroesophageal reflux disease)     Hypertension        Past Surgical History:   Procedure Laterality Date    TUBAL LIGATION  2005       Family History   Problem Relation Age of Onset    Hypertension Maternal Grandmother     Hypertension Mother     Migraines Mother     Hypertension Father     Diabetes Father     Rheum arthritis Father     Mental illness Sister         schizophrenia       Social History      Socioeconomic History    Marital status: Legally    Tobacco Use    Smoking status: Former Smoker     Packs/day: 0.00    Smokeless tobacco: Never Used    Tobacco comment: social smoker - 1 only.  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  . 14,8,7 y/okids.   Substance and Sexual Activity    Alcohol use: Yes     Alcohol/week: 3.0 standard drinks     Types: 3 Glasses of wine per week     Comment: occ    Drug use: Not Currently     Types: Marijuana    Sexual activity: Yes     Partners: Male     Birth control/protection: See Surgical Hx       MEDICATIONS & ALLERGIES:     Current Outpatient Medications on File Prior to Visit   Medication Sig    acetaminophen-caff-pyrilamine (MIDOL COMPLETE) 500-60-15 mg Tab Take by mouth.    loratadine (CLARITIN) 10 mg tablet Take 1 tablet (10 mg total) by mouth once daily.    pantoprazole (PROTONIX) 20 MG tablet Take 1 tablet (20 mg total) by mouth once daily.    valACYclovir (VALTREX) 1000 MG tablet Take 1 tablet (1,000 mg total) by mouth every 12 (twelve) hours. for 10 days     No current facility-administered medications on file prior to visit.       Review of patient's allergies indicates:  No Known Allergies    OBJECTIVE:     Vital Signs:  Vitals:    05/16/22 1440   BP: (!) 136/94   BP Location: Right arm   Patient Position: Sitting   BP Method: Large (Manual)   Pulse: 65   SpO2: 99%   Weight: 82.6 kg (182 lb 1.6 oz)   Height: 5' (1.524 m)       Body mass index is 35.56 kg/m².     Physical Exam:  General:  Well developed, well nourished, no acute distress  Head: Normocephalic, atraumatic  Eyes: PERRL, EOMI, clear sclera  Neck: positive for JVD  CVS:S1 and S2 normal, no murmurs  Resp: dyspnea when lay on examination table 30 degree, Lungs clear to auscultation, no wheezes, rales, rhonchi  GI:  Abdomen soft, non-tender, non-distended, normoactive bowel sounds  MSK: b/l tens non pitting LLE with some tenderness, bur without redness     Laboratory  Lab Results   Component  Value Date    WBC 4.96 05/06/2021    HGB 13.9 05/06/2021    HCT 41.8 05/06/2021    MCV 99 (H) 05/06/2021     05/06/2021     Lab Results   Component Value Date     05/06/2021     05/06/2021    K 3.9 05/06/2021     05/06/2021    CO2 24 05/06/2021    BUN 4 (L) 05/06/2021    CREATININE 0.8 05/06/2021    CALCIUM 9.2 05/06/2021     No results found for: INR, PROTIME  Lab Results   Component Value Date    HGBA1C 4.5 03/14/2016     No results for input(s): POCTGLUCOSE in the last 72 hours.        ASSESSMENT & PLAN:   Ms. Mario Alberto Rader is a 42 y.o. female  with PMHx of GERD, untreated HTN. Who present to the clinic with cc of LLE with associated HERNANDEZ and orthopnea and has JVD and LLE on exam. Concern for new onset heart failure. Will order some work up for heart failure and started low dose lasix for HTNa and volume overload     Mario Alberto was seen today for annual exam.    Diagnoses and all orders for this visit:    Hypertension, unspecified type  -     Comprehensive Metabolic Panel; Future  -     Hemoglobin A1C; Future  -     furosemide (LASIX) 20 MG tablet; Take 1 tablet (20 mg total) by mouth once daily.        -    instructed to monitor BP at home and to log all readings for next visit     Heart failure, unspecified HF chronicity, unspecified heart failure type  -     BNP; Future  -     Echo; Future  -     Lipid Panel; Future  -     furosemide (LASIX) 20 MG tablet; Take 1 tablet (20 mg total) by mouth once daily.        -     Instructed to follow low salt diet           Case discussed with Dr Ramon WHALEY in 2 week with possible cardiology referral based on workup result     Dianne Alexandra MD  Internal Medicine PGY3  [facility]  [address] [place] [address]

## 2022-07-19 ENCOUNTER — TELEPHONE (OUTPATIENT)
Dept: INTERNAL MEDICINE | Facility: CLINIC | Age: 42
End: 2022-07-19
Payer: MEDICAID

## 2022-07-19 NOTE — TELEPHONE ENCOUNTER
----- Message from Rachel De La O sent at 7/19/2022  2:33 PM CDT -----  Contact: [phone]  Pt said she has been waiting for a phone call back. I told pt she has to give the office time they are in clinic.

## 2022-07-19 NOTE — TELEPHONE ENCOUNTER
----- Message from Praveena Marcus sent at 7/19/2022 11:39 AM CDT -----  Contact: Self [phone]  Requesting an RX refill or new RX.    Is this a refill or new RX: refill    RX name and strength (copy/paste from chart):  furosemide (LASIX) 20 MG tablet    Is this a 30 day or 90 day RX: 30    Pharmacy name and phone # (copy/paste from chart):      Phone: [phone] Fax: [phone]    [facility] #[id number] - [place], [place] - [address] AT [facility] OF [address] [address]  [address] [address]  Phone: [phone] Fax: [phone]

## 2022-07-20 DIAGNOSIS — I10 HYPERTENSION, UNSPECIFIED TYPE: ICD-10-CM

## 2022-07-20 DIAGNOSIS — I50.9 HEART FAILURE, UNSPECIFIED HF CHRONICITY, UNSPECIFIED HEART FAILURE TYPE: ICD-10-CM

## 2022-07-20 RX ORDER — FUROSEMIDE 20 MG/1
20 TABLET ORAL DAILY
Qty: 60 TABLET | Refills: 5 | Status: SHIPPED | OUTPATIENT
Start: 2022-07-20 | End: 2023-04-13 | Stop reason: SDUPTHER

## 2022-07-20 RX ORDER — PANTOPRAZOLE SODIUM 20 MG/1
20 TABLET, DELAYED RELEASE ORAL DAILY
Qty: 30 TABLET | Refills: 5 | Status: SHIPPED | OUTPATIENT
Start: 2022-07-20 | End: 2023-02-27

## 2022-07-20 NOTE — TELEPHONE ENCOUNTER
----- Message from Virginie Toussaint sent at 7/20/2022 12:19 PM CDT -----  Contact: self/[phone]  Requesting an RX refill or new RX.  Is this a refill or new RX: refill  RX name and strength (copy/paste from chart):    Is this a 30 day or 90 day RX:  Pharmacy name and phone # (copy/paste from chart):    [facility] #[id number] - [place] [place] - [address] AT [facility] [address] [address]  [address] [place] [address]  Phone: [phone] Fax: [phone]  The doctors have asked that we provide their patients with the following 2 reminders -- prescription refills can take up to 72 hours, and a friendly reminder that in the future you can use your [facility] account to request refills:call back    Pt was seeing Dianne Alexandra    Please advice

## 2022-07-20 NOTE — TELEPHONE ENCOUNTER
----- Message from Atiya Severino sent at 7/20/2022 11:08 AM CDT -----  Contact: self [phone]  Patient is returning a phone call.  Who left a message for the patient: Cele Severino MA  Does patient know what this is regarding:    Would you like a call back, or a response through your [facility] portal?:  call back  Comments:      Please call and advise

## 2023-02-24 NOTE — TELEPHONE ENCOUNTER
Refill Routing Note   Medication(s) are not appropriate for processing by [facility] for the following reason(s):       Responsible provider unclear    [facility] action(s):  Defer    Medication Therapy Plan: Last ordered Marco A Navarro MD; Defer    Appointments  past 12m or future 3m with PCP    Date Provider   Last Visit   Visit date not found Marco A Navarro MD   Next Visit   Visit date not found Marco A Navarro MD   ED visits in past 90 days: 0        Note composed:6:31 PM 02/23/2023

## 2023-02-27 ENCOUNTER — PATIENT MESSAGE (OUTPATIENT)
Dept: INTERNAL MEDICINE | Facility: CLINIC | Age: 43
End: 2023-02-27
Payer: MEDICAID

## 2023-02-27 ENCOUNTER — TELEPHONE (OUTPATIENT)
Dept: INTERNAL MEDICINE | Facility: CLINIC | Age: 43
End: 2023-02-27
Payer: MEDICAID

## 2023-02-27 RX ORDER — PANTOPRAZOLE SODIUM 20 MG/1
20 TABLET, DELAYED RELEASE ORAL DAILY
Qty: 30 TABLET | Refills: 3 | Status: CANCELLED | OUTPATIENT
Start: 2023-02-27

## 2023-02-27 RX ORDER — PANTOPRAZOLE SODIUM 20 MG/1
TABLET, DELAYED RELEASE ORAL
Qty: 30 TABLET | Refills: 3 | Status: SHIPPED | OUTPATIENT
Start: 2023-02-27 | End: 2024-03-26

## 2023-02-27 NOTE — TELEPHONE ENCOUNTER
----- Message from Keren Harshal sent at 2/27/2023 12:08 PM CST -----  Contact: Patient @ [phone]  Requesting an RX refill or new RX.  Is this a refill or new RX:   RX name and strength pantoprazole (PROTONIX) 20 MG tablet      Is this a 30 day or 90 day RX:   Pharmacy name and phone #[id number] [place] [place] - [address] AT [facility] OF [address] [place] ([place] [address]  The doctors have asked that we provide their patients with the following 2 reminders -- prescription refills can take up to 72 hours, and a friendly reminder that in the future you can use your [facility] account to request refills:

## 2023-03-13 ENCOUNTER — OFFICE VISIT (OUTPATIENT)
Dept: INTERNAL MEDICINE | Facility: CLINIC | Age: 43
End: 2023-03-13
Payer: MEDICAID

## 2023-03-13 VITALS
SYSTOLIC BLOOD PRESSURE: 126 MMHG | HEART RATE: 95 BPM | HEIGHT: 60 IN | BODY MASS INDEX: 39.22 KG/M2 | DIASTOLIC BLOOD PRESSURE: 88 MMHG | OXYGEN SATURATION: 98 % | WEIGHT: 199.75 LBS

## 2023-03-13 DIAGNOSIS — K21.9 GASTROESOPHAGEAL REFLUX DISEASE, UNSPECIFIED WHETHER ESOPHAGITIS PRESENT: Primary | ICD-10-CM

## 2023-03-13 PROCEDURE — 3008F PR BODY MASS INDEX (BMI) DOCUMENTED: ICD-10-PCS | Mod: CPTII,,,

## 2023-03-13 PROCEDURE — 3079F DIAST BP 80-89 MM HG: CPT | Mod: CPTII,,,

## 2023-03-13 PROCEDURE — 99213 OFFICE O/P EST LOW 20 MIN: CPT | Mod: PBBFAC

## 2023-03-13 PROCEDURE — 99213 PR OFFICE/OUTPT VISIT, EST, LEVL III, 20-29 MIN: ICD-10-PCS | Mod: S$PBB,,,

## 2023-03-13 PROCEDURE — 3079F PR MOST RECENT DIASTOLIC BLOOD PRESSURE 80-89 MM HG: ICD-10-PCS | Mod: CPTII,,,

## 2023-03-13 PROCEDURE — 99999 PR PBB SHADOW E&M-EST. PATIENT-LVL III: ICD-10-PCS | Mod: PBBFAC,,,

## 2023-03-13 PROCEDURE — 99213 OFFICE O/P EST LOW 20 MIN: CPT | Mod: S$PBB,,,

## 2023-03-13 PROCEDURE — 3008F BODY MASS INDEX DOCD: CPT | Mod: CPTII,,,

## 2023-03-13 PROCEDURE — 3074F SYST BP LT 130 MM HG: CPT | Mod: CPTII,,,

## 2023-03-13 PROCEDURE — 3074F PR MOST RECENT SYSTOLIC BLOOD PRESSURE < 130 MM HG: ICD-10-PCS | Mod: CPTII,,,

## 2023-03-13 PROCEDURE — 99999 PR PBB SHADOW E&M-EST. PATIENT-LVL III: CPT | Mod: PBBFAC,,,

## 2023-03-13 RX ORDER — OMEPRAZOLE 20 MG/1
20 CAPSULE, DELAYED RELEASE ORAL DAILY
Qty: 30 CAPSULE | Refills: 11 | Status: SHIPPED | OUTPATIENT
Start: 2023-03-13 | End: 2023-04-14 | Stop reason: SDUPTHER

## 2023-03-13 NOTE — PATIENT INSTRUCTIONS
Please make an appoint to establish care with me next week.   In the mean time, please take omeprazole and collect a stool sample.

## 2023-03-13 NOTE — PROGRESS NOTES
Mario Alberto Rader  1980        Subjective     Reason for Visit:    History of Present Illness:  Ms. Mario Alberto Rader is a 43 y.o. female with a history of GERD and asthma who presents to clinic for establishing care. Last seen on 05/16/2022 for GERD and was prescribed a PPI and H.pylori stool sample ordered.. States that PPI helps with reflux, however patient tried cousin's omeprazole and states it provides better symptomatic relief. She continues to state epigastric abdominal pain that worsens after eating, relieved by PPI. She is also describes constipation and bloating relieved by castor oil. States she has adequate water intake. Reports that when constipation worsens, it causes her to have significant nausea and vomiting, without hematemesis. Her constipation is without melena.     Patient also wanted to establish care, however arrived late. Asked patient to come back next week to establish care, to which she was agreeabl.e    HPI         Review of Systems   Constitutional:  Negative for chills, fever, malaise/fatigue and weight loss.   HENT:  Negative for sore throat.    Eyes:  Negative for blurred vision.   Respiratory:  Negative for cough and wheezing.    Cardiovascular:  Negative for chest pain, palpitations and orthopnea.   Gastrointestinal:  Positive for abdominal pain, constipation, nausea and vomiting. Negative for blood in stool and diarrhea.   Genitourinary:  Negative for dysuria.   Musculoskeletal:  Negative for myalgias.   Neurological:  Negative for dizziness, tingling, weakness and headaches.   Endo/Heme/Allergies:  Does not bruise/bleed easily.   Psychiatric/Behavioral:  The patient is not nervous/anxious.       PAST HISTORY:     Past Medical History:   Diagnosis Date    Asthma     GERD (gastroesophageal reflux disease)     Hypertension        Past Surgical History:   Procedure Laterality Date    TUBAL LIGATION  2005       Family History   Problem Relation Age of Onset    Hypertension Maternal  Grandmother     Hypertension Mother     Migraines Mother     Hypertension Father     Diabetes Father     Rheum arthritis Father     Mental illness Sister         schizophrenia       Social History     Socioeconomic History    Marital status: Legally    Tobacco Use    Smoking status: Former     Packs/day: 0.00     Types: Cigarettes    Smokeless tobacco: Never    Tobacco comments:     social smoker - 1 only.  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   Substance and Sexual Activity    Alcohol use: Yes     Alcohol/week: 3.0 standard drinks     Types: 3 Glasses of wine per week     Comment: occ    Drug use: Not Currently     Types: Marijuana    Sexual activity: Yes     Partners: Male     Birth control/protection: See Surgical Hx       MEDICATIONS & ALLERGIES:     Current Outpatient Medications on File Prior to Visit   Medication Sig    acetaminophen-caff-pyrilamine (MIDOL COMPLETE) 500-60-15 mg Tab Take by mouth.    furosemide (LASIX) 20 MG tablet Take 1 tablet (20 mg total) by mouth once daily.    loratadine (CLARITIN) 10 mg tablet Take 1 tablet (10 mg total) by mouth once daily.    pantoprazole (PROTONIX) 20 MG tablet TAKE 1 TABLET(20 MG) BY MOUTH EVERY DAY    valACYclovir (VALTREX) 1000 MG tablet Take 1 tablet (1,000 mg total) by mouth every 12 (twelve) hours. for 10 days     No current facility-administered medications on file prior to visit.       Review of patient's allergies indicates:  No Known Allergies    OBJECTIVE:        Vital Signs:  Vitals:    03/13/23 1441   BP: 126/88   BP Location: Left arm   Patient Position: Sitting   BP Method: Large (Manual)   Pulse: 95   SpO2: 98%   Weight: 90.6 kg (199 lb 11.8 oz)   Height: 5' (1.524 m)       Body mass index is 39.01 kg/m².     Physical Exam:  Physical Exam  Vitals reviewed.   Constitutional:       General: She is not in acute distress.     Appearance: Normal appearance. She is not ill-appearing.   HENT:      Head: Normocephalic and atraumatic.       Mouth/Throat:      Mouth: Mucous membranes are moist.   Eyes:      Extraocular Movements: Extraocular movements intact.      Conjunctiva/sclera: Conjunctivae normal.   Cardiovascular:      Rate and Rhythm: Normal rate and regular rhythm.      Pulses: Normal pulses.      Heart sounds: Normal heart sounds.   Pulmonary:      Effort: Pulmonary effort is normal. No respiratory distress.      Breath sounds: Normal breath sounds. No wheezing.   Abdominal:      General: Abdomen is flat. Bowel sounds are normal. There is no distension.      Palpations: Abdomen is soft.      Tenderness: There is no abdominal tenderness. There is no guarding.   Musculoskeletal:      Right lower leg: No edema.      Left lower leg: No edema.   Skin:     General: Skin is warm.      Capillary Refill: Capillary refill takes less than 2 seconds.   Neurological:      General: No focal deficit present.      Mental Status: She is oriented to person, place, and time.      Motor: No weakness.   Psychiatric:         Mood and Affect: Mood normal.         Behavior: Behavior normal.          Laboratory  Lab Results   Component Value Date    WBC 4.96 05/06/2021    HGB 13.9 05/06/2021    HCT 41.8 05/06/2021    MCV 99 (H) 05/06/2021     05/06/2021     Lab Results   Component Value Date    GLU 82 05/16/2022     05/16/2022    K 4.1 05/16/2022     05/16/2022    CO2 26 05/16/2022    BUN 11 05/16/2022    CREATININE 0.8 05/16/2022    CALCIUM 9.1 05/16/2022    PROT 7.4 05/16/2022    BILITOT 0.6 05/16/2022    ALKPHOS 45 (L) 05/16/2022    ALT 46 (H) 05/16/2022    AST 20 05/16/2022     No results found for: INR, PROTIME  Lab Results   Component Value Date    CHOL 203 (H) 05/16/2022    HDL 61 05/16/2022    LDLCALC 92.8 05/16/2022    TRIG 246 (H) 05/16/2022     Lab Results   Component Value Date    HGBA1C 4.5 05/16/2022     Lab Results   Component Value Date    TSH 0.845 03/14/2016     No results for input(s): POCTGLUCOSE in the last 72 hours.        Health Maintenance         Date Due Completion Date    COVID-19 Vaccine (1) Never done ---    Mammogram Never done ---    Cervical Cancer Screening 03/14/2019 3/14/2016    Influenza Vaccine (1) Never done ---    Hemoglobin A1c (Diabetic Prevention Screening) 05/16/2025 5/16/2022    TETANUS VACCINE 06/09/2031 6/9/2021                ASSESSMENT & PLAN:       Ms. Mario Alberto Rader is a 43 y.o. female who was seen today in clinic for abdominal pain and establishing care. Will have patient follow up next week to establish care.     Mario Alberto was seen today for establish care.    Diagnoses and all orders for this visit:    Gastroesophageal reflux disease, unspecified whether esophagitis present  -     H. pylori antigen, stool; Future  -     omeprazole (PRILOSEC) 20 MG capsule; Take 1 capsule (20 mg total) by mouth once daily.           1. Gastroesophageal reflux disease, unspecified whether esophagitis present        Follow up in about 1 week (around 3/20/2023).    Other Orders Placed This Visit:  Orders Placed This Encounter   Procedures    H. pylori antigen, stool               Discussed with Dr. Christopher - staff attestation to follow    Marla Arreola MD  Internal Medicine, PGY-1  03/14/2023.2:34 PM  [facility]  [facility]  [address] [place] [address]  [phone]  [email or web address]

## 2023-03-16 NOTE — PROGRESS NOTES
I have reviewed and concur with the resident's history, physical, assessment, and plan.  See below addendum for my evaluation and additional findings.     42 y/o F with minimal PMH Who presents to clinic for worsening reflux symptoms since running out of her PPI. Patient is interested in getting a refill. She denies any hematemesis or worsening signs of an ulcer. Will follow up in one week to establish care.

## 2023-03-29 ENCOUNTER — PATIENT MESSAGE (OUTPATIENT)
Dept: INTERNAL MEDICINE | Facility: CLINIC | Age: 43
End: 2023-03-29
Payer: MEDICAID

## 2023-03-29 DIAGNOSIS — I50.9 HEART FAILURE, UNSPECIFIED HF CHRONICITY, UNSPECIFIED HEART FAILURE TYPE: ICD-10-CM

## 2023-03-29 DIAGNOSIS — I10 HYPERTENSION, UNSPECIFIED TYPE: ICD-10-CM

## 2023-03-29 RX ORDER — FUROSEMIDE 20 MG/1
20 TABLET ORAL DAILY
Qty: 60 TABLET | Refills: 5 | Status: CANCELLED | OUTPATIENT
Start: 2023-03-29 | End: 2024-03-28

## 2023-03-29 RX ORDER — OMEPRAZOLE 20 MG/1
20 CAPSULE, DELAYED RELEASE ORAL DAILY
Qty: 30 CAPSULE | Refills: 11 | Status: CANCELLED | OUTPATIENT
Start: 2023-03-29 | End: 2024-03-28

## 2023-04-10 ENCOUNTER — PATIENT MESSAGE (OUTPATIENT)
Dept: INTERNAL MEDICINE | Facility: CLINIC | Age: 43
End: 2023-04-10
Payer: MEDICAID

## 2023-04-10 DIAGNOSIS — I50.9 HEART FAILURE, UNSPECIFIED HF CHRONICITY, UNSPECIFIED HEART FAILURE TYPE: ICD-10-CM

## 2023-04-10 DIAGNOSIS — I10 HYPERTENSION, UNSPECIFIED TYPE: ICD-10-CM

## 2023-04-14 DIAGNOSIS — I10 HYPERTENSION, UNSPECIFIED TYPE: ICD-10-CM

## 2023-04-14 DIAGNOSIS — I50.9 HEART FAILURE, UNSPECIFIED HF CHRONICITY, UNSPECIFIED HEART FAILURE TYPE: ICD-10-CM

## 2023-04-14 RX ORDER — FUROSEMIDE 20 MG/1
20 TABLET ORAL DAILY
Qty: 60 TABLET | Refills: 5 | Status: SHIPPED | OUTPATIENT
Start: 2023-04-14 | End: 2024-03-26

## 2023-04-14 RX ORDER — OMEPRAZOLE 20 MG/1
20 CAPSULE, DELAYED RELEASE ORAL DAILY
Qty: 30 CAPSULE | Refills: 11 | Status: SHIPPED | OUTPATIENT
Start: 2023-04-14 | End: 2024-04-13

## 2023-04-14 RX ORDER — FUROSEMIDE 20 MG/1
20 TABLET ORAL DAILY
Qty: 60 TABLET | Refills: 5 | Status: SHIPPED | OUTPATIENT
Start: 2023-04-14 | End: 2023-04-14 | Stop reason: SDUPTHER

## 2024-01-18 ENCOUNTER — TELEPHONE (OUTPATIENT)
Dept: INTERNAL MEDICINE | Facility: CLINIC | Age: 44
End: 2024-01-18
Payer: MEDICAID

## 2024-01-18 NOTE — TELEPHONE ENCOUNTER
----- Message from Marla Dasilva sent at 1/18/2024 10:51 AM CST -----  Contact: Mairo Alberto   Mario Alberto would vargas a call back about getting a referral for Gastro

## 2024-01-24 DIAGNOSIS — K21.9 GASTROESOPHAGEAL REFLUX DISEASE, UNSPECIFIED WHETHER ESOPHAGITIS PRESENT: Primary | ICD-10-CM

## 2024-01-25 ENCOUNTER — TELEPHONE (OUTPATIENT)
Dept: INTERNAL MEDICINE | Facility: CLINIC | Age: 44
End: 2024-01-25
Payer: MEDICAID

## 2024-01-25 NOTE — TELEPHONE ENCOUNTER
Spoke w/ pt and she wanted to j=know if an external referral for GI could be sent because she wanted to be seen sooner. I notified her that we could send it to [facility] or if she finds another facility that takes her insurance. Pt understood.

## 2024-01-25 NOTE — PROGRESS NOTES
She has had a long standing history of reflux and last year, plan was to evaluate further, startign with H. Pylori stool testing. However, test has not been completed yet. Given chronicity of symptoms, will placed GI referrall.     Attempted to call patient back regarding concern of reflux. However, phone was unreachable. Will place order for GI referral now and try to call back patient later.

## 2024-01-25 NOTE — TELEPHONE ENCOUNTER
----- Message from Cammy Zavala sent at 1/25/2024  9:22 AM CST -----  Contact: Self/ [phone]  Patient is returning a phone call.  Who left a message for the patient: Thomas   Does patient know what this is regarding:  Yes  Would you like a call back, or a response through your [facility] portal?:   call back   Comments: pt sated that she has a question about a referral

## 2024-01-26 ENCOUNTER — TELEPHONE (OUTPATIENT)
Dept: INTERNAL MEDICINE | Facility: CLINIC | Age: 44
End: 2024-01-26
Payer: MEDICAID

## 2024-01-26 NOTE — TELEPHONE ENCOUNTER
----- Message from Candida Ledezma sent at 1/26/2024 10:48 AM CST -----  Contact: [phone]  Cc: Marla Arreola MD  Patient called, requested a courtesy call from nurse. Did not want to specify. Thank you.

## 2024-01-28 NOTE — PROGRESS NOTES
Called patient back and she stated she had everything that she needed and did not require any further clarification.

## 2024-01-31 ENCOUNTER — TELEPHONE (OUTPATIENT)
Dept: INTERNAL MEDICINE | Facility: CLINIC | Age: 44
End: 2024-01-31
Payer: MEDICAID

## 2024-01-31 NOTE — TELEPHONE ENCOUNTER
----- Message from Rachel De La O sent at 1/31/2024  9:27 AM CST -----  Contact: [phone]  Patient is returning a phone call.  Who left a message for the patient: n/a   Does patient know what this is regarding:  miss call   Would you like a call back, or a response through your [facility] portal?:   call pt back   Comments:

## 2024-03-26 ENCOUNTER — OFFICE VISIT (OUTPATIENT)
Dept: GASTROENTEROLOGY | Facility: CLINIC | Age: 44
End: 2024-03-26
Payer: MEDICAID

## 2024-03-26 VITALS
DIASTOLIC BLOOD PRESSURE: 118 MMHG | OXYGEN SATURATION: 99 % | HEIGHT: 60 IN | SYSTOLIC BLOOD PRESSURE: 179 MMHG | WEIGHT: 194.13 LBS | HEART RATE: 85 BPM | BODY MASS INDEX: 38.11 KG/M2

## 2024-03-26 DIAGNOSIS — K59.09 CHRONIC CONSTIPATION: ICD-10-CM

## 2024-03-26 DIAGNOSIS — R10.13 EPIGASTRIC PAIN: ICD-10-CM

## 2024-03-26 DIAGNOSIS — K21.9 GASTROESOPHAGEAL REFLUX DISEASE, UNSPECIFIED WHETHER ESOPHAGITIS PRESENT: Primary | ICD-10-CM

## 2024-03-26 PROCEDURE — 99999 PR PBB SHADOW E&M-EST. PATIENT-LVL IV: CPT | Mod: PBBFAC,,, | Performed by: NURSE PRACTITIONER

## 2024-03-26 PROCEDURE — 3008F BODY MASS INDEX DOCD: CPT | Mod: CPTII,,, | Performed by: NURSE PRACTITIONER

## 2024-03-26 PROCEDURE — 1159F MED LIST DOCD IN RCRD: CPT | Mod: CPTII,,, | Performed by: NURSE PRACTITIONER

## 2024-03-26 PROCEDURE — 1160F RVW MEDS BY RX/DR IN RCRD: CPT | Mod: CPTII,,, | Performed by: NURSE PRACTITIONER

## 2024-03-26 PROCEDURE — 99214 OFFICE O/P EST MOD 30 MIN: CPT | Mod: PBBFAC,PN | Performed by: NURSE PRACTITIONER

## 2024-03-26 PROCEDURE — 99204 OFFICE O/P NEW MOD 45 MIN: CPT | Mod: S$PBB,,, | Performed by: NURSE PRACTITIONER

## 2024-03-26 PROCEDURE — 3080F DIAST BP >= 90 MM HG: CPT | Mod: CPTII,,, | Performed by: NURSE PRACTITIONER

## 2024-03-26 PROCEDURE — 3077F SYST BP >= 140 MM HG: CPT | Mod: CPTII,,, | Performed by: NURSE PRACTITIONER

## 2024-03-26 NOTE — PROGRESS NOTES
Subjective:       Patient ID: Mario Alberto Rader is a 44 y.o. female.    Chief Complaint: Gastroesophageal Reflux, Bloated, and Constipation    43 y/o female referred by PCP for GERD and abdominal pain. Reports heartburn and epigastric pain after eating spicy foods. Regurgitation if she over eats. Taking omeprazole as needed with moderate relief. Hx of gastric ulcers noted on EGD in 2012; biopsies for HP were negative. Denies chronic NSAID use. She is constipated. Feels bloated and nauseous after 2 days without BM. No vomiting. Has BM 2-3x/week. Tried stool softeners and laxatives with some relief.            Past Medical History:   Diagnosis Date    Asthma     GERD (gastroesophageal reflux disease)     Hypertension        Past Surgical History:   Procedure Laterality Date    TUBAL LIGATION  2005       Family History   Problem Relation Age of Onset    Hypertension Maternal Grandmother     Hypertension Mother     Migraines Mother     Hypertension Father     Diabetes Father     Rheum arthritis Father     Mental illness Sister         schizophrenia       Social History     Socioeconomic History    Marital status: Other   Tobacco Use    Smoking status: Former     Types: Cigarettes    Smokeless tobacco: Never    Tobacco comments:     social smoker - 1 only.  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   Substance and Sexual Activity    Alcohol use: Yes     Alcohol/week: 3.0 standard drinks of alcohol     Types: 3 Glasses of wine per week     Comment: occ    Drug use: Not Currently     Types: Marijuana    Sexual activity: Yes     Partners: Male     Birth control/protection: See Surgical Hx       Review of Systems   Constitutional:  Negative for appetite change and unexpected weight change.   HENT:  Negative for trouble swallowing.    Respiratory:  Negative for shortness of breath.    Cardiovascular:  Negative for chest pain.   Gastrointestinal:  Positive for abdominal pain, constipation and nausea. Negative for blood in  stool.   Psychiatric/Behavioral:  Negative for dysphoric mood.          Objective:     Vitals:    03/26/24 1323   BP: (!) 179/118   BP Location: Right arm   Patient Position: Sitting   BP Method: Medium (Automatic)   Pulse: 85   SpO2: 99%   Weight: 88 kg (194 lb 1.8 oz)   Height: 5' (1.524 m)          Physical Exam  Constitutional:       General: She is not in acute distress.     Appearance: Normal appearance.   Eyes:      Conjunctiva/sclera: Conjunctivae normal.   Pulmonary:      Effort: Pulmonary effort is normal. No respiratory distress.   Musculoskeletal:         General: Normal range of motion.      Cervical back: Normal range of motion.   Skin:     General: Skin is warm and dry.   Neurological:      Mental Status: She is alert and oriented to person, place, and time.   Psychiatric:         Mood and Affect: Mood normal.         Behavior: Behavior normal.               Assessment:         ICD-10-CM ICD-9-CM   1. Gastroesophageal reflux disease, unspecified whether esophagitis present  K21.9 530.81   2. Epigastric pain  R10.13 789.06   3. Chronic constipation  K59.09 564.00       Plan:       Gastroesophageal reflux disease, unspecified whether esophagitis present  -     Continue PPI daily  -     Case Request Endoscopy: EGD (ESOPHAGOGASTRODUODENOSCOPY)  - Discussed elimination of dietary triggers (fatty foods, caffeine, chocolate, spicy foods, food with high fat content, carbonated beverages, and peppermint.  - Raise the head of your bed by 6 to 8 inches - You can do this by putting blocks of wood or rubber under 2 legs of the bed or a foam wedge under the mattress.  - Avoid late meals - Lying down with a full stomach can make reflux worse. Try to plan meals for at least 2 to 3 hours before bedtime.  - Avoid tight clothing - Some people feel better if they wear comfortable clothing that does not squeeze the stomach area.     Epigastric pain  -     Case Request Endoscopy: EGD (ESOPHAGOGASTRODUODENOSCOPY)    Chronic  constipation  -     linaCLOtide (LINZESS) 72 mcg Cap capsule; Take 1 capsule (72 mcg total) by mouth once daily.  Dispense: 30 capsule; Refill: 2      Follow up if symptoms worsen or fail to improve.     Patient's Medications   New Prescriptions    LINACLOTIDE (LINZESS) 72 MCG CAP CAPSULE    Take 1 capsule (72 mcg total) by mouth once daily.   Previous Medications    OMEPRAZOLE (PRILOSEC) 20 MG CAPSULE    Take 1 capsule (20 mg total) by mouth once daily.   Modified Medications    No medications on file   Discontinued Medications    ACETAMINOPHEN-CAFF-PYRILAMINE (MIDOL COMPLETE) 500-60-15 MG TAB    Take by mouth.    FUROSEMIDE (LASIX) 20 MG TABLET    Take 1 tablet (20 mg total) by mouth once daily.    LORATADINE (CLARITIN) 10 MG TABLET    Take 1 tablet (10 mg total) by mouth once daily.    PANTOPRAZOLE (PROTONIX) 20 MG TABLET    TAKE 1 TABLET(20 MG) BY MOUTH EVERY DAY    VALACYCLOVIR (VALTREX) 1000 MG TABLET    Take 1 tablet (1,000 mg total) by mouth every 12 (twelve) hours. for 10 days

## 2024-03-26 NOTE — PATIENT INSTRUCTIONS
I would like for you to buy a small box of dulcolax tablets and bottle of liquid John's Milk of Magnesia (do not use the pills).  When you have time to stay home near the toilet take 2 Dulcolax tablets. Then in 1 hour drink 90 mL of milk of magnesia. Drink another 90 mL 2-3 hours later.     After that is complete, start Linzess 72 mcg.       EGD Prep Instructions    [facility] [address] [place]  [address]    You are scheduled for an EGD with Dr. Crawford on 4/4/2024 at [facility].  You will enter through the [facility] entrance and check in at Same Day Surgery.    Nothing to eat or drink after midnight before the procedure.  You MAY brush your teeth.    You MAY take your blood pressure, heart, and seizure medication on the morning of the procedure, with a SIP of water.  Hold ALL other medications until after the procedure.    You must have someone with you to DRIVE YOU HOME since you will be receiving IV sedation for the procedure.    If you are on blood thinners THAT YOU HAVE BEEN INSTRUCTED TO HOLD BY YOUR DOCTOR FOR THIS PROCEDURE, then do NOT take this the morning of your EGD.  Do NOT stop these medications on your own, they must be approved to be held by your doctor.  Your EGD can NOT be done if you are on these medications.  Examples of blood thinners include: Coumadin, Aggrenox, Plavix, Pradaxa, Reapro, Pletal, Xarelto, Ticagrelor, Brilinta, Eliquis, and high dose aspirin (325 mg).  You do not have to stop baby aspirin 81 mg.    You will receive a call the afternoon before your EGD to tell you the time to arrive.  If you have not received a call by the day before your procedure, call the Pre-op Coordinator at [phone].

## 2024-04-15 ENCOUNTER — PATIENT MESSAGE (OUTPATIENT)
Dept: GASTROENTEROLOGY | Facility: CLINIC | Age: 44
End: 2024-04-15
Payer: MEDICAID

## 2024-04-22 RX ORDER — OMEPRAZOLE 20 MG/1
20 CAPSULE, DELAYED RELEASE ORAL DAILY
Qty: 90 CAPSULE | Refills: 3 | Status: SHIPPED | OUTPATIENT
Start: 2024-04-22 | End: 2025-04-22

## 2024-04-22 NOTE — TELEPHONE ENCOUNTER
----- Message from Jacqueline Curran sent at 4/22/2024  9:45 AM CDT -----  Contact: [phone]  Requesting an RX refill or new RX.  Is this a refill or new RX:  refill   RX name and strength (copy/paste from chart):  omeprazole (PRILOSEC) 20 MG capsule  Is this a 30 day or 90 day RX:   Pharmacy name and phone # (copy/paste from chart):    [facility]/pharmacy #[id number] - [place] - [address]  [address] [address]  Phone: [phone] Fax: [phone]     The doctors have asked that we provide their patients with the following 2 reminders -- prescription refills can take up to 72 hours, and a friendly reminder that in the future you can use your [facility] account to request refills: y

## 2024-04-29 ENCOUNTER — TELEPHONE (OUTPATIENT)
Dept: GASTROENTEROLOGY | Facility: CLINIC | Age: 44
End: 2024-04-29
Payer: MEDICAID

## 2024-04-29 NOTE — TELEPHONE ENCOUNTER
Informed pt of EGD results. Informed pt to continue taking omeprazole daily. Pt voiced understanding.     ----- Message from Mechelle Crawford MD sent at 4/11/2024  3:32 PM CDT -----  EoE (-), celiac (-), HP (-), cont PPI, RTC as needed

## 2024-07-03 DIAGNOSIS — K59.09 CHRONIC CONSTIPATION: ICD-10-CM

## 2024-07-03 RX ORDER — LINACLOTIDE 72 UG/1
72 CAPSULE, GELATIN COATED ORAL
Qty: 30 CAPSULE | Refills: 5 | Status: SHIPPED | OUTPATIENT
Start: 2024-07-03

## 2024-07-03 NOTE — TELEPHONE ENCOUNTER
Patient called stating that she has been out of her Linzess for 2 weeks and that even when she eats light, she still feels constipated. She also said that she did the MOM cleanse on Monday and has been having watery diarrhea ever since.     ----- Message from Araceli Muhammad sent at 7/3/2024 12:01 PM CDT -----  Type:  RX Refill Request    Who Called: Pt   Refill or New Rx: Refill   RX Name and Strength:linaCLOtide (LINZESS) 72 mcg Cap capsule  Preferred Pharmacy with phone number:[facility]/pharmacy #[id number] - [place], [place] - [address] [facility] [address]  Local or Mail Order: Local   Ordering Provider: Humberto  Would the patient rather a call back or a response via [facility]? Call   Best Call Back Number: [phone]  Additional Information: requesting a call back to discuss other concerns (did not specify)

## 2024-08-21 ENCOUNTER — TELEPHONE (OUTPATIENT)
Dept: GASTROENTEROLOGY | Facility: CLINIC | Age: 44
End: 2024-08-21
Payer: MEDICAID

## 2024-08-21 DIAGNOSIS — K21.9 GASTROESOPHAGEAL REFLUX DISEASE, UNSPECIFIED WHETHER ESOPHAGITIS PRESENT: Primary | ICD-10-CM

## 2024-08-21 DIAGNOSIS — K59.09 CHRONIC CONSTIPATION: ICD-10-CM

## 2024-08-21 NOTE — TELEPHONE ENCOUNTER
Spoke with patient, patient stated that her Omeprazole and her Linzess needs a PA. I informed patient that our office does not complete PA so we may have to send it to an [facility] pharmacy in order for them to complete the PA. Patient asked why don't we just call [facility], I again tried to explain to patient that her rx will have to go to an [facility] for a PA. Patient became upset saying that she does not know anything about an [facility] and that we just needs to call [facility]. I informed patient that I will give her a call back. Message sent to provider.    ----- Message from Zena Pires sent at 8/21/2024  1:34 PM CDT -----  Type:  Patient Returning Call    Who Called: Mario Alberto Rader  Who Left Message for Patient: Danielle  Does the patient know what this is regarding?: Personal  Would the patient rather a call back or a response via [facility]?  call  Best Call Back Number:  [phone]  Additional Information:

## 2024-08-21 NOTE — TELEPHONE ENCOUNTER
Attempted to contact patient, no answer, no VM box set up.    ----- Message from Debbie Santiago sent at 8/21/2024 12:39 PM CDT -----  Needs advice from nurse:      Who Called:pt  Regarding:pt would like a call back/personal  Would the patient rather a call back or VIA [facility]?  Best Call Back number:[phone]  Additional Info:

## 2024-08-23 RX ORDER — OMEPRAZOLE 20 MG/1
20 CAPSULE, DELAYED RELEASE ORAL DAILY
Qty: 90 CAPSULE | Refills: 3 | Status: SHIPPED | OUTPATIENT
Start: 2024-08-23 | End: 2025-08-23

## 2024-08-23 NOTE — TELEPHONE ENCOUNTER
Spoke with patient via telephone regarding medication refills. Prescriptions sent to [facility] and will mail medication to patient.

## 2024-09-24 ENCOUNTER — PATIENT MESSAGE (OUTPATIENT)
Dept: GASTROENTEROLOGY | Facility: CLINIC | Age: 44
End: 2024-09-24
Payer: MEDICAID

## 2024-10-30 ENCOUNTER — OFFICE VISIT (OUTPATIENT)
Dept: SURGERY | Facility: CLINIC | Age: 44
End: 2024-10-30
Payer: MEDICAID

## 2024-10-30 VITALS
OXYGEN SATURATION: 99 % | SYSTOLIC BLOOD PRESSURE: 179 MMHG | HEIGHT: 60 IN | DIASTOLIC BLOOD PRESSURE: 110 MMHG | HEART RATE: 77 BPM | WEIGHT: 183.44 LBS | BODY MASS INDEX: 36.02 KG/M2

## 2024-10-30 DIAGNOSIS — K59.09 CHRONIC CONSTIPATION: ICD-10-CM

## 2024-10-30 DIAGNOSIS — R10.13 EPIGASTRIC PAIN: Primary | ICD-10-CM

## 2024-10-30 DIAGNOSIS — K44.9 HIATAL HERNIA WITH GERD: ICD-10-CM

## 2024-10-30 DIAGNOSIS — K21.9 HIATAL HERNIA WITH GERD: ICD-10-CM

## 2024-10-30 PROCEDURE — 3008F BODY MASS INDEX DOCD: CPT | Mod: CPTII,,, | Performed by: SURGERY

## 2024-10-30 PROCEDURE — 3077F SYST BP >= 140 MM HG: CPT | Mod: CPTII,,, | Performed by: SURGERY

## 2024-10-30 PROCEDURE — 99204 OFFICE O/P NEW MOD 45 MIN: CPT | Mod: S$PBB,,, | Performed by: SURGERY

## 2024-10-30 PROCEDURE — 3080F DIAST BP >= 90 MM HG: CPT | Mod: CPTII,,, | Performed by: SURGERY

## 2024-10-30 PROCEDURE — 99999 PR PBB SHADOW E&M-EST. PATIENT-LVL III: CPT | Mod: PBBFAC,,, | Performed by: SURGERY

## 2024-10-30 PROCEDURE — 99213 OFFICE O/P EST LOW 20 MIN: CPT | Mod: PBBFAC | Performed by: SURGERY

## 2024-10-30 NOTE — PROGRESS NOTES
[facility] H&P    Subjective:     Mario Alberto Rader is a 44 y.o. female with PMH HTN, GERD, Asthma, Constipation who presents to clinic for worsening reflux symptoms and a small hiatal hernia noted on imaging.    She has been having GERD symptoms since 2005 but they decreased after a few years. Unfortunately, the symptoms started increasing again about 2-3 years ago and continues to worsen. She is unable to eat much (sometimes only soups) because she feels full, starts having heartburn and reflux symptoms.     She is also struggling to have regular bowel movements going back and forth between constipation and diarrhea. States she feels bloated all the time and notices her stomach is distended after she attempts to eat. She lost about 20lbs. She takes Linzess and Prilosec at night which relieves symptoms but gives her diarrhea. Past EGD on 4/4/24 showed small hiatal hernia and gastritis but she has had no imaging or studies since then.     GERD Questionnaire  Meds: Linzess 72mcg, Prilosec 20mg daily   + Typical heartburn  + Regurgitation  + Dysphagia solids, started 2-3 years ago    - Dysphagia liquids  + Hoarseness  - Sore throat  + Cough  - Asthma  + Chest pain, radiating to shoulder   + Water brash, occasionally   - Globus  + Nausea  + Vomiting     only if has dysphagia  Eckardt Score (none =0, occ=1, daily=2, every meal=3, weight: 0=0, <5=1, 5-10=2, >10=3)  Dysphagia = 2  Regurgitation = 1  Chest pain = 2  Weight loss (kg) = 0 lb (liquid dieting due to symptoms and being full), 0  Total = 5     PMH:   Past Medical History:   Diagnosis Date    Asthma     no meds    Constipation     GERD (gastroesophageal reflux disease)     Hypertension     no current meds       Past Surgical History:   Past Surgical History:   Procedure Laterality Date    ESOPHAGOGASTRODUODENOSCOPY N/A 4/4/2024    Procedure: EGD (ESOPHAGOGASTRODUODENOSCOPY);  Surgeon: Mechelle Crawford MD;  Location: [facility];  Service:  Endoscopy;  Laterality: N/A;    TUBAL LIGATION  01/01/2005    WISDOM TOOTH EXTRACTION      x1       Social History:  Social History     Socioeconomic History    Marital status: Other   Tobacco Use    Smoking status: Former     Types: Cigarettes    Smokeless tobacco: Never    Tobacco comments:     social smoker - 1 only.  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  . 16,8,7 y/okids.smokes Marijuana    Substance and Sexual Activity    Alcohol use: Not Currently     Comment: sips of wine in a week    Drug use: Yes     Types: Marijuana     Comment: as needed last smoked yesterday    Sexual activity: Yes     Partners: Male     Social Drivers of Health     Financial Resource Strain: High Risk (10/23/2024)    Overall Financial Resource Strain (CARDIA)     Difficulty of Paying Living Expenses: Hard   Food Insecurity: Food Insecurity Present (10/23/2024)    Hunger Vital Sign     Worried About Running Out of Food in the Last Year: Sometimes true     Ran Out of Food in the Last Year: Sometimes true   Physical Activity: Sufficiently Active (10/23/2024)    Exercise Vital Sign     Days of Exercise per Week: 7 days     Minutes of Exercise per Session: 30 min   Stress: Stress Concern Present (10/23/2024)    [employer] [place] [employer] - Occupational Stress Questionnaire     Feeling of Stress : To some extent   Housing Stability: High Risk (10/23/2024)    Housing Stability Vital Sign     Unable to Pay for Housing in the Last Year: Yes       Allergies: Review of patient's allergies indicates:  No Known Allergies    Medications:  Current Outpatient Medications on File Prior to Visit   Medication Sig Dispense Refill    linaCLOtide (LINZESS) 72 mcg Cap capsule Take 1 capsule (72 mcg total) by mouth before breakfast. 90 capsule 3    omeprazole (PRILOSEC) 20 MG capsule Take 1 capsule (20 mg total) by mouth once daily. 90 capsule 3    acetaminophen/pamabrom (MIDOL ORAL) Take 1 tablet by mouth as needed.       Current  Facility-Administered Medications on File Prior to Visit   Medication Dose Route Frequency Provider Last Rate Last Admin    0.9%  NaCl infusion   Intravenous Continuous Mechelle Crawford MD        sodium chloride 0.9% flush 10 mL  10 mL Intravenous PRN Mechelle Crawford MD             Objective:     Vital Signs (Most Recent)  Pulse: 77 (10/30/24 1440)  BP: (!) 179/110 (10/30/24 1440)  SpO2: 99 % (10/30/24 1440)    Review of Systems   Constitutional:  Positive for weight loss. Negative for chills, fever and malaise/fatigue.   Respiratory:  Positive for cough. Negative for shortness of breath.    Cardiovascular:  Negative for chest pain and palpitations.   Gastrointestinal:  Positive for constipation, diarrhea, heartburn, nausea and vomiting. Negative for abdominal pain.   Genitourinary:  Negative for dysuria, frequency and urgency.   Endo/Heme/Allergies:  Does not bruise/bleed easily.   Psychiatric/Behavioral:  Negative for depression. The patient is not nervous/anxious.      Physical Exam:  Gen: awake, alert, in no acute distress  HEENT: normocephalic, atraumatic, EOMI, no scleral icterus  CV: regular rate and rhythm  Pulm: equal chest rise bilaterally, normal work of breathing  Abd:  soft, slightly tender to palpation in the epigastric region, slight distension noted, prominent xiphoid process   Ext: WWP, skin warm and dry    Imaging  The following imaging was reviewed:   EGD 4/4/24 - Normal esophagus. Biopsied. Small hiatal hernia. Erythematous mucosa in the antrum. Biopsied. Normal examined duodenum. Biopsied.     Biopsy Results from EGD 4/4/24  1. Duodenum, biopsy:   Duodenal mucosa with no diagnostic histopathologic alterations   No morphologic evidence of gluten-sensitive enteropathy     2. Stomach, antrum, biopsy:   Gastric oxyntic mucosa with mild chronic inactive gastritis   No morphologic evidence of Helicobacter pylori on routine stain   Negative for intestinal metaplasia and dysplasia     3.  Esophagus, proximal, biopsy:   Squamous mucosa with no diagnostic histopathologic abnormalities   Negative for increased intraepithelial eosinophils       Assessment:     Mario Alberto Rader is a 44 y.o. female with PMH GERD, asthma, constipation, HTN here for evaluation of a small hiatal hernia and worsening upper GI related symptoms for the past 2-3 years.      Plan:     - continue Linzess and Prilosec as prescribed  - Plan for GES, EGD with Endo flip and PH probe, and barium swallow study  - RTC after imaging and test results finalize        Natalie Dela Cruz, MS4  Medical Student  [facility]

## 2024-11-06 ENCOUNTER — HOSPITAL ENCOUNTER (OUTPATIENT)
Dept: RADIOLOGY | Facility: HOSPITAL | Age: 44
Discharge: HOME | End: 2024-11-06
Attending: SURGERY
Payer: MEDICAID

## 2024-11-06 ENCOUNTER — TELEPHONE (OUTPATIENT)
Dept: ENDOSCOPY | Facility: HOSPITAL | Age: 44
End: 2024-11-06
Payer: MEDICAID

## 2024-11-06 VITALS — BODY MASS INDEX: 35.73 KG/M2 | HEIGHT: 60 IN | WEIGHT: 182 LBS

## 2024-11-06 DIAGNOSIS — R10.13 EPIGASTRIC PAIN: ICD-10-CM

## 2024-11-06 DIAGNOSIS — K21.9 GASTROESOPHAGEAL REFLUX DISEASE, UNSPECIFIED WHETHER ESOPHAGITIS PRESENT: Primary | ICD-10-CM

## 2024-11-06 PROCEDURE — 74240 X-RAY XM UPR GI TRC 1CNTRST: CPT | Mod: TC,FY

## 2024-11-06 PROCEDURE — 25500020 PHARM REV CODE 255: Performed by: SURGERY

## 2024-11-06 PROCEDURE — 74240 X-RAY XM UPR GI TRC 1CNTRST: CPT | Mod: 26,,, | Performed by: STUDENT IN AN ORGANIZED HEALTH CARE EDUCATION/TRAINING PROGRAM

## 2024-11-06 PROCEDURE — A9698 NON-RAD CONTRAST MATERIALNOC: HCPCS | Performed by: SURGERY

## 2024-11-06 RX ADMIN — BARIUM SULFATE 100 ML: 0.6 SUSPENSION ORAL at 09:11

## 2024-11-06 NOTE — TELEPHONE ENCOUNTER
Referral for procedure from [facility]      Spoke to Mario Alberto to schedule procedure(s) Upper Endoscopy (EGD) with [facility]       Physician to perform procedure(s) Dr. AURELIO Daily  Date of Procedure (s) 11/25/24  Arrival Time 11:45 AM  Time of Procedure(s) 12:45 PM   Location of Procedure(s) [address]  Type of Rx Prep sent to patient: Other  Instructions provided to patient via [facility]    Patient was informed on the following information and verbalized understanding. Screening questionnaire reviewed with patient and complete. If procedure requires anesthesia, a responsible adult needs to be present to accompany the patient home, patient cannot drive after receiving anesthesia. Appointment details are tentative, especially check-in time. Patient will receive a prep-op call 7 days prior to confirm check-in time for procedure. If applicable the patient should contact their pharmacy to verify Rx for procedure prep is ready for pick-up. Patient was advised to call the scheduling department at [phone] if pharmacy states no Rx is available. Patient was advised to call the endoscopy scheduling department if any questions or concerns arise.       Endoscopy Scheduling Department

## 2024-11-06 NOTE — TELEPHONE ENCOUNTER
"----- Message from TEODORA Negrete sent at 10/30/2024  3:47 PM CDT -----  Regarding: Scheduling an EGD with bravo and endoflip  Procedure: EGD/Endoflip/Bravo     Diagnosis: GERD     Procedure Timing: Within 4 weeks (Urgent)    #If within 4 weeks selected, please kahlil as high priority#    #If greater than 12 weeks, please select "5-12 weeks" and delay sending until 3 months prior to requested date#       Additional Scheduling Information:     Is the patient taking a GLP-1 Agonist and/or blood thinner :no    Have you attached a patient to this message: yes  "

## 2024-11-12 PROBLEM — K44.9 HIATAL HERNIA WITH GERD: Status: ACTIVE | Noted: 2024-03-26

## 2024-11-12 PROBLEM — B00.9 HERPES SIMPLEX: Status: RESOLVED | Noted: 2021-06-14 | Resolved: 2024-11-12

## 2024-11-12 NOTE — PROGRESS NOTES
History & Physical    Subjective     History of Present Illness:  Patient is a 44 year-old obese woman with PMH HTN, DLD, and chronic constipation who presents for evaluation of epigastric pain and known hiatal hernia. She endorses mild symptoms of heartburn since around 2005 that over the last 2-3 years have progressively worsened. She describes frequent postprandial epigastric pain, fullness, and early satiety, with associated heartburn. She has difficulty maintaining normal bowel movements alternating between constipation and diarrhea for which she takes Linzess. She suffers from frequent bloating worse after eating. She has lost approx 20 lbs due to decreased oral intake. She has some incomplete improvement with nightly Prilosec.     GERD Questionnaire  Meds: Linzess 72mcg, Prilosec 20mg daily   + Typical heartburn  + Regurgitation  + Dysphagia solids, started 2-3 years ago    - Dysphagia liquids  + Hoarseness  - Sore throat  + Cough  - Asthma  + Chest pain, radiating to shoulder   + Water brash, occasionally   - Globus  + Nausea  + Vomiting     Chief Complaint   Patient presents with    Gastroesophageal Reflux    Hiatal Hernia    Abdominal Pain     epigastric     Review of patient's allergies indicates:  No Known Allergies    Current Outpatient Medications   Medication Sig Dispense Refill    linaCLOtide (LINZESS) 72 mcg Cap capsule Take 1 capsule (72 mcg total) by mouth before breakfast. 90 capsule 3    omeprazole (PRILOSEC) 20 MG capsule Take 1 capsule (20 mg total) by mouth once daily. 90 capsule 3     No current facility-administered medications for this visit.     Past Medical History:   Diagnosis Date    Antral gastritis 09/13/2012    Asthma     Cholelithiasis 08/22/2012    Constipation     Herpes simplex type 2 infection 06/09/2021    Hiatal hernia 04/04/2024    Hypercholesterolemia 05/16/2022    Hypertension      Past Surgical History:   Procedure Laterality Date    ESOPHAGOGASTRODUODENOSCOPY N/A  4/4/2024    Procedure: EGD (ESOPHAGOGASTRODUODENOSCOPY);  Surgeon: Mechelle Crawford MD;  Location: [facility];  Service: Endoscopy;  Laterality: N/A;    TUBAL LIGATION  01/01/2005    WISDOM TOOTH EXTRACTION      x1     Family History   Problem Relation Name Age of Onset    Hypertension Maternal Grandmother      Hypertension Mother      Migraines Mother      Hypertension Father      Diabetes Father      Rheum arthritis Father      Mental illness Sister          schizophrenia     Social History     Tobacco Use    Smoking status: Former     Types: Cigarettes    Smokeless tobacco: Never    Tobacco comments:     social smoker - 1 only.  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   HENT: Negative.     Eyes: Negative.    Respiratory:  Positive for cough.    Cardiovascular: Negative.    Gastrointestinal:  Positive for constipation, diarrhea, nausea and vomiting.   Endocrine: Negative.    Genitourinary: Negative.    Musculoskeletal: Negative.    Skin: Negative.    Allergic/Immunologic: Negative.    Neurological: Negative.    Hematological: Negative.    Psychiatric/Behavioral: Negative.          Objective     Vital Signs (Most Recent)  Pulse: 77 (10/30/24 1440)  BP: (!) 179/110 (10/30/24 1440)  SpO2: 99 % (10/30/24 1440)  5' (1.524 m)  83.2 kg (183 lb 6.8 oz)     Physical Exam:  Physical Exam  Vitals reviewed.   Constitutional:       General: She is not in acute distress.     Appearance: Normal appearance. She is well-developed. She is not ill-appearing.   HENT:      Head: Normocephalic and atraumatic.   Eyes:      General: No scleral icterus.     Conjunctiva/sclera: Conjunctivae normal.   Cardiovascular:      Rate and Rhythm:  Normal rate and regular rhythm.      Heart sounds: Normal heart sounds.   Pulmonary:      Effort: Pulmonary effort is normal. No respiratory distress.      Breath sounds: Normal breath sounds.   Abdominal:      General: There is no distension.      Palpations: Abdomen is soft.      Tenderness: There is abdominal tenderness (mild ttp in epigastrium; prominent xiphoid). There is no guarding.   Musculoskeletal:         General: Normal range of motion.      Cervical back: Normal range of motion and neck supple.   Skin:     General: Skin is warm and dry.      Coloration: Skin is not jaundiced.   Neurological:      General: No focal deficit present.      Mental Status: She is alert and oriented to person, place, and time.   Psychiatric:         Mood and Affect: Mood normal.         Behavior: Behavior normal.       Diagnostic Results:  US: Reviewed  EGD: Reviewed       Assessment and Plan   Patient is a 44 year-old woman with small hiatal hernia and post-prandial abdominal symptoms primarily epigastric pain, early satiety, weight loss, and bloating. Review of prior work-up noted cholelithiasis in 2012. Regarding her upper GI anatomy and functionality will obtain UGI, GES, and EGD with Bravo pH study and EndoFlip. RTC following work-up to discuss findings and surgical options pending results and diagnosis. All questions were answered to patient's satisfaction and she is in agreement with the plan. It was a pleasure meeting MsFlorian Dior and thank you for this consultation.    Yamilet Schmidt  10/30/24

## 2024-11-25 ENCOUNTER — ANESTHESIA (OUTPATIENT)
Dept: ENDOSCOPY | Facility: HOSPITAL | Age: 44
End: 2024-11-25
Payer: MEDICAID

## 2024-11-25 ENCOUNTER — ANESTHESIA EVENT (OUTPATIENT)
Dept: ENDOSCOPY | Facility: HOSPITAL | Age: 44
End: 2024-11-25
Payer: MEDICAID

## 2024-11-25 ENCOUNTER — HOSPITAL ENCOUNTER (OUTPATIENT)
Facility: HOSPITAL | Age: 44
Discharge: HOME | End: 2024-11-25
Attending: INTERNAL MEDICINE | Admitting: INTERNAL MEDICINE
Payer: MEDICAID

## 2024-11-25 VITALS
TEMPERATURE: 98 F | WEIGHT: 182 LBS | OXYGEN SATURATION: 100 % | DIASTOLIC BLOOD PRESSURE: 91 MMHG | RESPIRATION RATE: 16 BRPM | BODY MASS INDEX: 35.73 KG/M2 | HEIGHT: 60 IN | HEART RATE: 75 BPM | SYSTOLIC BLOOD PRESSURE: 181 MMHG

## 2024-11-25 DIAGNOSIS — K21.9 GERD (GASTROESOPHAGEAL REFLUX DISEASE): ICD-10-CM

## 2024-11-25 DIAGNOSIS — K21.9 HIATAL HERNIA WITH GERD: Primary | ICD-10-CM

## 2024-11-25 DIAGNOSIS — K44.9 HIATAL HERNIA WITH GERD: Primary | ICD-10-CM

## 2024-11-25 PROCEDURE — 91040 ESOPH BALLOON DISTENSION TST: CPT | Performed by: INTERNAL MEDICINE

## 2024-11-25 PROCEDURE — 63600175 PHARM REV CODE 636 W HCPCS: Performed by: NURSE ANESTHETIST, CERTIFIED REGISTERED

## 2024-11-25 PROCEDURE — 43239 EGD BIOPSY SINGLE/MULTIPLE: CPT | Mod: 59,,, | Performed by: INTERNAL MEDICINE

## 2024-11-25 PROCEDURE — 63600175 PHARM REV CODE 636 W HCPCS: Performed by: STUDENT IN AN ORGANIZED HEALTH CARE EDUCATION/TRAINING PROGRAM

## 2024-11-25 PROCEDURE — 37000009 HC ANESTHESIA EA ADD 15 MINS: Performed by: INTERNAL MEDICINE

## 2024-11-25 PROCEDURE — 27201012 HC FORCEPS, HOT/COLD, DISP: Performed by: INTERNAL MEDICINE

## 2024-11-25 PROCEDURE — 88305 TISSUE EXAM BY PATHOLOGIST: CPT | Performed by: PATHOLOGY

## 2024-11-25 PROCEDURE — 27200942: Performed by: INTERNAL MEDICINE

## 2024-11-25 PROCEDURE — 91040 ESOPH BALLOON DISTENSION TST: CPT | Mod: 26,,, | Performed by: INTERNAL MEDICINE

## 2024-11-25 PROCEDURE — 37000008 HC ANESTHESIA 1ST 15 MINUTES: Performed by: INTERNAL MEDICINE

## 2024-11-25 PROCEDURE — 43239 EGD BIOPSY SINGLE/MULTIPLE: CPT | Mod: 59 | Performed by: INTERNAL MEDICINE

## 2024-11-25 PROCEDURE — C1726 CATH, BAL DIL, NON-VASCULAR: HCPCS | Performed by: INTERNAL MEDICINE

## 2024-11-25 RX ORDER — GLUCAGON 1 MG
1 KIT INJECTION
Status: DISCONTINUED | OUTPATIENT
Start: 2024-11-25 | End: 2024-11-25 | Stop reason: HOSPADM

## 2024-11-25 RX ORDER — SODIUM CHLORIDE 9 MG/ML
INJECTION, SOLUTION INTRAVENOUS CONTINUOUS
Status: DISCONTINUED | OUTPATIENT
Start: 2024-11-25 | End: 2024-11-25 | Stop reason: HOSPADM

## 2024-11-25 RX ORDER — PROPOFOL 10 MG/ML
VIAL (ML) INTRAVENOUS
Status: DISCONTINUED | OUTPATIENT
Start: 2024-11-25 | End: 2024-11-25

## 2024-11-25 RX ORDER — LABETALOL HYDROCHLORIDE 5 MG/ML
INJECTION, SOLUTION INTRAVENOUS
Status: DISCONTINUED | OUTPATIENT
Start: 2024-11-25 | End: 2024-11-25

## 2024-11-25 RX ORDER — LABETALOL HYDROCHLORIDE 5 MG/ML
10 INJECTION, SOLUTION INTRAVENOUS ONCE
Status: COMPLETED | OUTPATIENT
Start: 2024-11-25 | End: 2024-11-25

## 2024-11-25 RX ORDER — SODIUM CHLORIDE 0.9 % (FLUSH) 0.9 %
10 SYRINGE (ML) INJECTION
Status: DISCONTINUED | OUTPATIENT
Start: 2024-11-25 | End: 2024-11-25 | Stop reason: HOSPADM

## 2024-11-25 RX ADMIN — LABETALOL HYDROCHLORIDE 10 MG: 5 INJECTION, SOLUTION INTRAVENOUS at 02:11

## 2024-11-25 RX ADMIN — PROPOFOL 225 MCG/KG/MIN: 10 INJECTION, EMULSION INTRAVENOUS at 01:11

## 2024-11-25 RX ADMIN — LABETALOL HYDROCHLORIDE 5 MG: 5 INJECTION, SOLUTION INTRAVENOUS at 01:11

## 2024-11-25 RX ADMIN — PROPOFOL 100 MG: 10 INJECTION, EMULSION INTRAVENOUS at 01:11

## 2024-11-25 NOTE — H&P
Short Stay Endoscopy History and Physical    PCP - Marla Arreola MD     Procedure - EGD with Endoflip and Bravo  ASA - per anesthesia  Mallampati - per anesthesia  History of Anesthesia problems - no  Family history Anesthesia problems -  no   Plan of anesthesia - General    HPI:  This is a 44 y.o. female here for evaluation of : GERD    ROS:  Constitutional: No fevers, chills, No weight loss  CV: No chest pain  Pulm: No cough, No shortness of breath  Ophtho: No vision changes  GI: see HPI  Derm: No rash    Medical History:  has a past medical history of Antral gastritis (09/13/2012), Asthma, Cholelithiasis (08/22/2012), Constipation, Herpes simplex type 2 infection (06/09/2021), Hiatal hernia (04/04/2024), Hypercholesterolemia (05/16/2022), and Hypertension.    Surgical History:  has a past surgical history that includes Tubal ligation (01/01/2005); [place] tooth extraction; and Esophagogastroduodenoscopy (N/A, 4/4/2024).    Family History: family history includes Diabetes in her father; Hypertension in her father, maternal grandmother, and mother; Mental illness in her sister; Migraines in her mother; Rheum arthritis in her father.. Otherwise no colon cancer, inflammatory bowel disease, or GI malignancies.    Social History:  reports that she has quit smoking. Her smoking use included cigarettes. She has never used smokeless tobacco. She reports that she does not currently use alcohol. She reports current drug use. Drug: Marijuana.    Review of patient's allergies indicates:  No Known Allergies    Medications:   Medications Prior to Admission   Medication Sig Dispense Refill Last Dose/Taking    linaCLOtide (LINZESS) 72 mcg Cap capsule Take 1 capsule (72 mcg total) by mouth before breakfast. 90 capsule 3     omeprazole (PRILOSEC) 20 MG capsule Take 1 capsule (20 mg total) by mouth once daily. 90 capsule 3        Physical Exam:    Vital Signs: There were no vitals filed for this visit.    Gen: NAD, lying  comfortably  HENT: NCAT, oropharynx clear  Eyes: anicteric sclerae, EOMI grossly  Neck: supple, no visible masses/goiter  Cardiac: RRR  Lungs: non-labored breathing  Abd: soft, NT/ND, normoactive BS  Ext: no LE edema, warm, well perfused  Skin: skin intact on exposed body parts, no visible rashes, lesions  Neuro: A&Ox4, neuro exam grossly intact, moves all extremities  Psych: appropriate mood, affect      Labs:  Lab Results   Component Value Date    WBC 4.96 05/06/2021    HGB 13.9 05/06/2021    HCT 41.8 05/06/2021     05/06/2021    CHOL 203 (H) 05/16/2022    TRIG 246 (H) 05/16/2022    HDL 61 05/16/2022    ALT 46 (H) 05/16/2022    AST 20 05/16/2022     05/16/2022    K 4.1 05/16/2022     05/16/2022    CREATININE 0.8 05/16/2022    BUN 11 05/16/2022    CO2 26 05/16/2022    TSH 0.845 03/14/2016    HGBA1C 4.5 05/16/2022       Plan:  EGD with Bravo and Endoflip for GERD.    I have explained the risks and benefits of endoscopy procedures to the patient including but not limited to bleeding, perforation, infection, and death.  The patient was asked if they understand and allowed to ask any further questions to their satisfaction.      Krupa Daily MD

## 2024-11-25 NOTE — PROVATION PATIENT INSTRUCTIONS
Discharge Summary/Instructions after an Endoscopic Procedure  Patient Name: Mario Alberto Rader  Patient MRN: [id number]  Patient YOB: 1980 Monday, November 25, 2024  Krupa Daily MD  Dear patient,  As a result of recent federal legislation (The Federal Cures Act), you may   receive lab or pathology results from your procedure in your [facility]   account before your physician is able to contact you. Your physician or   their representative will relay the results to you with their   recommendations at their soonest availability.  Thank you,  RESTRICTIONS:  During your procedure today, you received medications for sedation.  These   medications may affect your judgment, balance and coordination.  Therefore,   for 24 hours, you have the following restrictions:   - DO NOT drive a car, operate machinery, make legal/financial decisions,   sign important papers or drink alcohol.    ACTIVITY:  Today: no heavy lifting, straining or running due to procedural   sedation/anesthesia.  The following day: return to full activity including work.  DIET:  Eat and drink normally unless instructed otherwise.     TREATMENT FOR COMMON SIDE EFFECTS:  - Mild abdominal pain, nausea, belching, bloating or excessive gas:  rest,   eat lightly and use a heating pad.  - Sore Throat: treat with throat lozenges and/or gargle with warm salt   water.  - Because air was used during the procedure, expelling large amounts of air   from your rectum or belching is normal.  - If a bowel prep was taken, you may not have a bowel movement for 1-3 days.    This is normal.  SYMPTOMS TO WATCH FOR AND REPORT TO YOUR PHYSICIAN:  1. Abdominal pain or bloating, other than gas cramps.  2. Chest pain.  3. Back pain.  4. Signs of infection such as: chills or fever occurring within 24 hours   after the procedure.  5. Rectal bleeding, which would show as bright red, maroon, or black stools.   (A tablespoon of blood from the rectum is not serious, especially if    hemorrhoids are present.)  6. Vomiting.  7. Weakness or dizziness.  GO DIRECTLY TO THE NEAREST EMERGENCY ROOM IF YOU HAVE ANY OF THE FOLLOWING:      Difficulty breathing              Chills and/or fever over 101 F   Persistent vomiting and/or vomiting blood   Severe abdominal pain   Severe chest pain   Black, tarry stools   Bleeding- more than one tablespoon   Any other symptom or condition that you feel may need urgent attention  Your doctor recommends these additional instructions:  If any biopsies were taken, your doctors clinic will contact you in 1 to 2   weeks with any results.  - Discharge patient to home.   - Resume previous diet.   - Continue present medications.   - Await pathology results.  For questions, problems or results please call your physician - Krupa Daily MD at Work:  [phone].  [facility], EMERGENCY ROOM PHONE NUMBER: [phone]  IF A COMPLICATION OR EMERGENCY SITUATION ARISES AND YOU ARE UNABLE TO REACH   YOUR PHYSICIAN - GO DIRECTLY TO THE EMERGENCY ROOM.  Krupa Daily MD  11/25/2024 1:35:10 PM  This report has been verified and signed electronically.  Dear patient,  As a result of recent federal legislation (The Federal Cures Act), you may   receive lab or pathology results from your procedure in your [facility]   account before your physician is able to contact you. Your physician or   their representative will relay the results to you with their   recommendations at their soonest availability.  Thank you,  PROVATION

## 2024-11-25 NOTE — PLAN OF CARE
Patient states they are ready to be discharged. Instructions given to patient and family. Both verbalize understanding. Patient tolerating po liquids with no difficulty. Patient denies pain. Anesthesia consent and surgical consent in chart upon patient's discharge from [facility].

## 2024-11-25 NOTE — ANESTHESIA PREPROCEDURE EVALUATION
11/25/2024  Mario Alberto Rader is a 44 y.o., female with a PMHx of GERD, HTN, HLD, and mild asthma who presents for EGD      Pre-op Assessment    I have reviewed the Patient Summary Reports.     I have reviewed the Nursing Notes. I have reviewed the NPO Status.   I have reviewed the Medications.     Review of Systems  Anesthesia Hx:  No problems with previous Anesthesia               Denies Personal Hx of Anesthesia complications.                    Social:  Non-Smoker, No Alcohol Use       Hematology/Oncology:  Hematology Normal   Oncology Normal                                   Cardiovascular:     Hypertension                                          Pulmonary:    Asthma mild                   Renal/:  Renal/ Normal                 Hepatic/GI:     GERD                Musculoskeletal:  Musculoskeletal Normal                Neurological:  Neurology Normal                                      Psych:  Psychiatric Normal                    Physical Exam  General: Well nourished, Cooperative, Alert and Oriented    Airway:  Mallampati: I   Mouth Opening: Normal  TM Distance: Normal  Tongue: Normal  Neck ROM: Normal ROM    Dental:  Intact    Chest/Lungs:  Clear to auscultation, Normal Respiratory Rate    Heart:  Rate: Normal  Rhythm: Regular Rhythm        Anesthesia Plan  Type of Anesthesia, risks & benefits discussed:    Anesthesia Type: Gen ETT, Gen Natural Airway  Intra-op Monitoring Plan: Standard ASA Monitors  Post Op Pain Control Plan: multimodal analgesia and IV/PO Opioids PRN  Induction:  IV  Airway Plan: Direct, Post-Induction  Informed Consent: Informed consent signed with the Patient and all parties understand the risks and agree with anesthesia plan.  All questions answered.   ASA Score: 2  Day of Surgery Review of History & Physical: H&P Update referred to the surgeon/provider.    Ready For Surgery  From Anesthesia Perspective.     .

## 2024-11-25 NOTE — ANESTHESIA POSTPROCEDURE EVALUATION
Anesthesia Post Evaluation    Patient: Mario Alberto Rader    Procedure(s) Performed: Procedure(s) (LRB):  EGD (ESOPHAGOGASTRODUODENOSCOPY) (N/A)  PH MONITORING, ESOPHAGUS, WIRELESS, (OFF REFLUX MEDS) (N/A)    Final Anesthesia Type: general      Patient location during evaluation: PACU  Patient participation: Yes- Able to Participate  Level of consciousness: awake and alert  Post-procedure vital signs: reviewed and stable  Pain management: adequate  Airway patency: patent    PONV status at discharge: No PONV  Anesthetic complications: no      Cardiovascular status: blood pressure returned to baseline and hemodynamically stable  Respiratory status: spontaneous ventilation  Hydration status: euvolemic  Follow-up not needed.              Vitals Value Taken Time   /91 11/25/24 1500   Temp 36.8 °C (98.2 °F) 11/25/24 1500   Pulse 75 11/25/24 1500   Resp 16 11/25/24 1500   SpO2 100 % 11/25/24 1500         No case tracking events are documented in the log.      Pain/Jesus Manuel Score: Jesus Manuel Score: 9 (11/25/2024  1:40 PM)

## 2024-11-25 NOTE — TRANSFER OF CARE
Anesthesia Transfer of Care Note    Patient: Mario Alberto Rader    Procedure(s) Performed: Procedure(s) (LRB):  EGD (ESOPHAGOGASTRODUODENOSCOPY) (N/A)  PH MONITORING, ESOPHAGUS, WIRELESS, (OFF REFLUX MEDS) (N/A)    Patient location: [facility]    Anesthesia Type: general    Transport from OR: Transported from OR on room air with adequate spontaneous ventilation    Post pain: adequate analgesia    Post assessment: no apparent anesthetic complications    Post vital signs: stable    Level of consciousness: awake    Nausea/Vomiting: no nausea/vomiting    Complications: none    Transfer of care protocol was followed      Last vitals: Visit Vitals  /94   Pulse 82   Temp 36.6 °C (97.9 °F)   Resp 16   Ht 5' (1.524 m)   Wt 82.6 kg (182 lb)   SpO2 96%   Breastfeeding No   BMI 35.54 kg/m²

## 2024-11-29 LAB
FINAL PATHOLOGIC DIAGNOSIS: NORMAL
GROSS: NORMAL
Lab: NORMAL

## 2024-12-03 ENCOUNTER — PATIENT MESSAGE (OUTPATIENT)
Dept: GASTROENTEROLOGY | Facility: CLINIC | Age: 44
End: 2024-12-03
Payer: MEDICAID

## 2024-12-03 ENCOUNTER — HOSPITAL ENCOUNTER (OUTPATIENT)
Dept: RADIOLOGY | Facility: HOSPITAL | Age: 44
Discharge: HOME | End: 2024-12-03
Attending: SURGERY
Payer: MEDICAID

## 2024-12-03 DIAGNOSIS — R10.13 EPIGASTRIC PAIN: ICD-10-CM

## 2024-12-03 PROCEDURE — A9541 TC99M SULFUR COLLOID: HCPCS | Performed by: SURGERY

## 2024-12-03 PROCEDURE — 78264 GASTRIC EMPTYING IMG STUDY: CPT | Mod: 26,,, | Performed by: RADIOLOGY

## 2024-12-03 PROCEDURE — 78264 GASTRIC EMPTYING IMG STUDY: CPT | Mod: TC

## 2024-12-03 RX ORDER — TECHNETIUM TC 99M SULFUR COLLOID 2 MG
1 KIT MISCELLANEOUS
Status: COMPLETED | OUTPATIENT
Start: 2024-12-03 | End: 2024-12-03

## 2024-12-03 RX ADMIN — TECHNETIUM TC 99M SULFUR COLLOID KIT 1.1 MILLICURIE: KIT at 08:12

## 2024-12-04 ENCOUNTER — TELEPHONE (OUTPATIENT)
Dept: SURGERY | Facility: CLINIC | Age: 44
End: 2024-12-04
Payer: MEDICAID

## 2024-12-04 NOTE — TELEPHONE ENCOUNTER
Pt scheduled for 12/11 to come to clinic to discuss surgery.  Pt ok with date/time.     Bed in lowest position, wheels locked, appropriate side rails in place/Call bell, personal items and telephone in reach/Instruct patient to call for assistance before getting out of bed or chair/Non-slip footwear when patient is out of bed/[place] to call system/Physically safe environment - no spills, clutter or unnecessary equipment/Purposeful Proactive Rounding/Room/bathroom lighting operational, light cord in reach

## 2024-12-04 NOTE — TELEPHONE ENCOUNTER
----- Message from Yamilet Schmidt MD sent at 12/4/2024 12:58 PM CST -----  Regarding: Schedule pre-op appt in clinic  Team,  This patient's work-up shows a hiatal hernia with GERD. Please schedule her to return to clinic for pre-op discussion, consents, and scheduling. Thank you!  NASH

## 2024-12-11 ENCOUNTER — OFFICE VISIT (OUTPATIENT)
Dept: SURGERY | Facility: CLINIC | Age: 44
End: 2024-12-11
Payer: MEDICAID

## 2024-12-11 ENCOUNTER — LAB VISIT (OUTPATIENT)
Dept: LAB | Facility: HOSPITAL | Age: 44
End: 2024-12-11
Attending: SURGERY
Payer: MEDICAID

## 2024-12-11 VITALS
DIASTOLIC BLOOD PRESSURE: 85 MMHG | HEIGHT: 60 IN | WEIGHT: 177.69 LBS | SYSTOLIC BLOOD PRESSURE: 127 MMHG | BODY MASS INDEX: 34.89 KG/M2 | HEART RATE: 95 BPM

## 2024-12-11 DIAGNOSIS — K44.9 HIATAL HERNIA WITH GERD AND ESOPHAGITIS: Primary | ICD-10-CM

## 2024-12-11 DIAGNOSIS — K80.20 CALCULUS OF GALLBLADDER WITHOUT CHOLECYSTITIS WITHOUT OBSTRUCTION: ICD-10-CM

## 2024-12-11 DIAGNOSIS — Z01.818 PREOP TESTING: ICD-10-CM

## 2024-12-11 DIAGNOSIS — K21.00 HIATAL HERNIA WITH GERD AND ESOPHAGITIS: Primary | ICD-10-CM

## 2024-12-11 DIAGNOSIS — R11.10 REGURGITATION OF FOOD: ICD-10-CM

## 2024-12-11 LAB
ANION GAP SERPL CALC-SCNC: 9 MMOL/L (ref 8–16)
BASOPHILS # BLD AUTO: 0.03 K/UL (ref 0–0.2)
BASOPHILS NFR BLD: 0.7 % (ref 0–1.9)
BUN SERPL-MCNC: 5 MG/DL (ref 6–20)
CALCIUM SERPL-MCNC: 9.2 MG/DL (ref 8.7–10.5)
CHLORIDE SERPL-SCNC: 108 MMOL/L (ref 95–110)
CO2 SERPL-SCNC: 26 MMOL/L (ref 23–29)
CREAT SERPL-MCNC: 0.8 MG/DL (ref 0.5–1.4)
DIFFERENTIAL METHOD BLD: ABNORMAL
EOSINOPHIL # BLD AUTO: 0 K/UL (ref 0–0.5)
EOSINOPHIL NFR BLD: 0.7 % (ref 0–8)
ERYTHROCYTE [DISTWIDTH] IN BLOOD BY AUTOMATED COUNT: 11.3 % (ref 11.5–14.5)
EST. GFR  (NO RACE VARIABLE): >60 ML/MIN/1.73 M^2
GLUCOSE SERPL-MCNC: 83 MG/DL (ref 70–110)
HCT VFR BLD AUTO: 40 % (ref 37–48.5)
HGB BLD-MCNC: 13.2 G/DL (ref 12–16)
IMM GRANULOCYTES # BLD AUTO: 0.01 K/UL (ref 0–0.04)
IMM GRANULOCYTES NFR BLD AUTO: 0.2 % (ref 0–0.5)
LYMPHOCYTES # BLD AUTO: 2.1 K/UL (ref 1–4.8)
LYMPHOCYTES NFR BLD: 44.8 % (ref 18–48)
MCH RBC QN AUTO: 33.6 PG (ref 27–31)
MCHC RBC AUTO-ENTMCNC: 33 G/DL (ref 32–36)
MCV RBC AUTO: 102 FL (ref 82–98)
MONOCYTES # BLD AUTO: 0.5 K/UL (ref 0.3–1)
MONOCYTES NFR BLD: 11.4 % (ref 4–15)
NEUTROPHILS # BLD AUTO: 1.9 K/UL (ref 1.8–7.7)
NEUTROPHILS NFR BLD: 42.2 % (ref 38–73)
NRBC BLD-RTO: 0 /100 WBC
PLATELET # BLD AUTO: 316 K/UL (ref 150–450)
PMV BLD AUTO: 9.5 FL (ref 9.2–12.9)
POTASSIUM SERPL-SCNC: 3.7 MMOL/L (ref 3.5–5.1)
RBC # BLD AUTO: 3.93 M/UL (ref 4–5.4)
SODIUM SERPL-SCNC: 143 MMOL/L (ref 136–145)
WBC # BLD AUTO: 4.58 K/UL (ref 3.9–12.7)

## 2024-12-11 PROCEDURE — 99999 PR PBB SHADOW E&M-EST. PATIENT-LVL IV: CPT | Mod: PBBFAC,,, | Performed by: SURGERY

## 2024-12-11 PROCEDURE — 99214 OFFICE O/P EST MOD 30 MIN: CPT | Mod: PBBFAC | Performed by: SURGERY

## 2024-12-11 PROCEDURE — 85025 COMPLETE CBC W/AUTO DIFF WBC: CPT | Performed by: SURGERY

## 2024-12-11 PROCEDURE — 36415 COLL VENOUS BLD VENIPUNCTURE: CPT | Performed by: SURGERY

## 2024-12-11 PROCEDURE — 80048 BASIC METABOLIC PNL TOTAL CA: CPT | Performed by: SURGERY

## 2024-12-11 NOTE — H&P (VIEW-ONLY)
History & Physical    Subjective     History of Present Illness:  Patient is a 44 year-old obese woman with PMH HTN, DLD, and chronic constipation who presents for evaluation of epigastric pain and known hiatal hernia. She endorses mild symptoms of heartburn since around 2005 that over the last 2-3 years have progressively worsened. She describes frequent postprandial epigastric pain, fullness, and early satiety, with associated heartburn. She has difficulty maintaining normal bowel movements alternating between constipation and diarrhea for which she takes Linzess. She suffers from frequent bloating worse after eating. She has lost approx 20 lbs due to decreased oral intake. She has some incomplete improvement with nightly Prilosec.    Interval history 12/11/24:  Patient presents for follow up after receiving workup with EGD with bravo and esophagram. Has been off of Prilosec since EGD. States her heartburn has worsened as a result. Is using a a waist- which gives her some relief.  some symptoms have worsened, especially while eating rice. Also with some RUQ pain after eating. Patient works as a      GERD Questionnaire  Meds: Linzess 72mcg, has been off of Prilosec since EGD   + Typical heartburn, worsened since stopping omeprazole  + Regurgitation  + Dysphagia solids, started 2-3 years ago    - Dysphagia liquids  + Hoarseness  - Sore throat  + Cough  - Asthma  + Chest pain, radiating to shoulder   + Water brash, occasionally   - Globus  + Nausea  + Vomiting     No chief complaint on file.    Review of patient's allergies indicates:  No Known Allergies    Current Outpatient Medications   Medication Sig Dispense Refill    linaCLOtide (LINZESS) 72 mcg Cap capsule Take 1 capsule (72 mcg total) by mouth before breakfast. 90 capsule 3    omeprazole (PRILOSEC) 20 MG capsule Take 1 capsule (20 mg total) by mouth once daily. 90 capsule 3     No current facility-administered medications for this visit.      Past Medical History:   Diagnosis Date    Antral gastritis 09/13/2012    Asthma     Cholelithiasis 08/22/2012    Constipation     Herpes simplex type 2 infection 06/09/2021    Hiatal hernia 04/04/2024    Hypercholesterolemia 05/16/2022    Hypertension      Past Surgical History:   Procedure Laterality Date    ESOPHAGOGASTRODUODENOSCOPY N/A 4/4/2024    Procedure: EGD (ESOPHAGOGASTRODUODENOSCOPY);  Surgeon: Mechelle Crawford MD;  Location: [facility];  Service: Endoscopy;  Laterality: N/A;    ESOPHAGOGASTRODUODENOSCOPY N/A 11/25/2024    Procedure: EGD (ESOPHAGOGASTRODUODENOSCOPY);  Surgeon: Krupa Daily MD;  Location: [facility] (2ND FLR);  Service: Endoscopy;  Laterality: N/A;  referral dwaine/ Endoflip/propfol only/prep isnt sent to pt via portal / no allergy to metal or jewlery  11/18-unable to lvm for pre call-tb  11/19 precall attempted, unable to leave voicemail SS  11/21 precall attempted, unable to leave voicemail SS      PH MONITORING, ESOPHAGUS, WIRELESS, (OFF REFLUX MEDS) N/A 11/25/2024    Procedure: PH MONITORING, ESOPHAGUS, WIRELESS, (OFF REFLUX MEDS);  Surgeon: Krupa Daily MD;  [facility]  Service: Endoscopy;  Laterality: N/A;    TUBAL LIGATION  01/01/2005    WISDOM TOOTH EXTRACTION      x1     Family History   Problem Relation Name Age of Onset    Hypertension Maternal Grandmother      Hypertension Mother      Migraines Mother      Hypertension Father      Diabetes Father      Rheum arthritis Father      Mental illness Sister          schizophrenia     Social History     Tobacco Use    Smoking status: Former     Types: Cigarettes    Smokeless tobacco: Never    Tobacco comments:     social smoker - 1 only.  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  . 14,8,7 y/okids.smokes Marijuana    Substance Use Topics    Alcohol use: Not Currently     Comment: sips of wine in a week    Drug use: Yes     Types: Marijuana     Comment: as needed last smoked yesterday      Review of  Systems:  Review of Systems   Constitutional:  Positive for unexpected weight change.   HENT: Negative.     Eyes: Negative.    Respiratory:  Positive for cough.    Cardiovascular: Negative.    Gastrointestinal:  Positive for constipation, diarrhea, nausea and vomiting.   Endocrine: Negative.    Genitourinary: Negative.    Musculoskeletal: Negative.    Skin: Negative.    Allergic/Immunologic: Negative.    Neurological: Negative.    Hematological: Negative.    Psychiatric/Behavioral: Negative.          Objective     Vital Signs (Most Recent)  Pulse: 95 (12/11/24 1428)  BP: 127/85 (12/11/24 1428)  5' (1.524 m)  80.6 kg (177 lb 11.1 oz)     Physical Exam:  Physical Exam  Vitals reviewed.   Constitutional:       General: She is not in acute distress.     Appearance: Normal appearance. She is well-developed. She is not ill-appearing.   HENT:      Head: Normocephalic and atraumatic.   Eyes:      General: No scleral icterus.     Conjunctiva/sclera: Conjunctivae normal.   Cardiovascular:      Rate and Rhythm: Normal rate and regular rhythm.      Heart sounds: Normal heart sounds.   Pulmonary:      Effort: Pulmonary effort is normal. No respiratory distress.      Breath sounds: Normal breath sounds.   Abdominal:      General: There is no distension.      Palpations: Abdomen is soft.      Tenderness: There is abdominal tenderness (mild ttp in epigastrium; prominent xiphoid). There is no guarding.   Musculoskeletal:         General: Normal range of motion.      Cervical back: Normal range of motion and neck supple.   Skin:     General: Skin is warm and dry.      Coloration: Skin is not jaundiced.   Neurological:      General: No focal deficit present.      Mental Status: She is alert and oriented to person, place, and time.   Psychiatric:         Mood and Affect: Mood normal.         Behavior: Behavior normal.       Diagnostic Results:  US: Reviewed  EGD: Reviewed  Gastric Emptying study: Reviewed  Esophagram: Reviewed  Bravo  study: Reviewed       Assessment and Plan   Patient is a 44 year-old woman with small hiatal hernia and post-prandial abdominal symptoms primarily epigastric pain, early satiety, weight loss, and bloating. Review of prior work-up noted cholelithiasis in 2012. UGI demonstrating 3 cm hiatal hernia with LA Grade A esophagitis. Bravo placement confirmed diagnosis of GERD. Esophagram confirmed small hiatal hernia and gastric emptying study without signs of delayed gastric emptying.    - Plan is for Laparoscopic hiatal hernia repair AND laparoscopic cholecystectomy   - Discussed risks, benefits, and alternative management. Patient would like to proceed with surgery  - Consent obtained in clinic  - Discussed expected postoperative recovery including recommendation to not return to work for 4-6 weeks      Rai Singleton  10/30/24

## 2024-12-11 NOTE — PROGRESS NOTES
History & Physical    Subjective     History of Present Illness:  Patient is a 44 year-old obese woman with PMH HTN, DLD, and chronic constipation who presents for evaluation of epigastric pain and known hiatal hernia. She endorses mild symptoms of heartburn since around 2005 that over the last 2-3 years have progressively worsened. She describes frequent postprandial epigastric pain, fullness, and early satiety, with associated heartburn. She has difficulty maintaining normal bowel movements alternating between constipation and diarrhea for which she takes Linzess. She suffers from frequent bloating worse after eating. She has lost approx 20 lbs due to decreased oral intake. She has some incomplete improvement with nightly Prilosec.    Interval history 12/11/24:  Patient presents for follow up after receiving workup with EGD with bravo and esophagram. Has been off of Prilosec since EGD. States her heartburn has worsened as a result. Is using a a waist- which gives her some relief.  some symptoms have worsened, especially while eating rice. Also with some RUQ pain after eating. Patient works as a      GERD Questionnaire  Meds: Linzess 72mcg, has been off of Prilosec since EGD   + Typical heartburn, worsened since stopping omeprazole  + Regurgitation  + Dysphagia solids, started 2-3 years ago    - Dysphagia liquids  + Hoarseness  - Sore throat  + Cough  - Asthma  + Chest pain, radiating to shoulder   + Water brash, occasionally   - Globus  + Nausea  + Vomiting     No chief complaint on file.    Review of patient's allergies indicates:  No Known Allergies    Current Outpatient Medications   Medication Sig Dispense Refill    linaCLOtide (LINZESS) 72 mcg Cap capsule Take 1 capsule (72 mcg total) by mouth before breakfast. 90 capsule 3    omeprazole (PRILOSEC) 20 MG capsule Take 1 capsule (20 mg total) by mouth once daily. 90 capsule 3     No current facility-administered medications for this visit.      Past Medical History:   Diagnosis Date    Antral gastritis 09/13/2012    Asthma     Cholelithiasis 08/22/2012    Constipation     Herpes simplex type 2 infection 06/09/2021    Hiatal hernia 04/04/2024    Hypercholesterolemia 05/16/2022    Hypertension      Past Surgical History:   Procedure Laterality Date    ESOPHAGOGASTRODUODENOSCOPY N/A 4/4/2024    Procedure: EGD (ESOPHAGOGASTRODUODENOSCOPY);  Surgeon: Mechelel Crawford MD;  [facility];  Service: Endoscopy;  Laterality: N/A;    ESOPHAGOGASTRODUODENOSCOPY N/A 11/25/2024    Procedure: EGD (ESOPHAGOGASTRODUODENOSCOPY);  Surgeon: Krupa Daily MD;  Location: [facility] (2ND FLR);  Service: Endoscopy;  Laterality: N/A;  referral dwaine/ Endoflip/propfol only/prep isnt sent to pt via portal / no allergy to metal or jewlery  11/18-unable to lvm for pre call-tb  11/19 precall attempted, unable to leave voicemail SS  11/21 precall attempted, unable to leave voicemail SS      PH MONITORING, ESOPHAGUS, WIRELESS, (OFF REFLUX MEDS) N/A 11/25/2024    Procedure: PH MONITORING, ESOPHAGUS, WIRELESS, (OFF REFLUX MEDS);  Surgeon: Krupa Daily MD;  [facility]  Service: Endoscopy;  Laterality: N/A;    TUBAL LIGATION  01/01/2005    WISDOM TOOTH EXTRACTION      x1     Family History   Problem Relation Name Age of Onset    Hypertension Maternal Grandmother      Hypertension Mother      Migraines Mother      Hypertension Father      Diabetes Father      Rheum arthritis Father      Mental illness Sister          schizophrenia     Social History     Tobacco Use    Smoking status: Former     Types: Cigarettes    Smokeless tobacco: Never    Tobacco comments:     social smoker - 1 only.  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   HENT: Negative.     Eyes: Negative.    Respiratory:  Positive for cough.    Cardiovascular: Negative.    Gastrointestinal:  Positive for constipation, diarrhea, nausea and vomiting.   Endocrine: Negative.    Genitourinary: Negative.    Musculoskeletal: Negative.    Skin: Negative.    Allergic/Immunologic: Negative.    Neurological: Negative.    Hematological: Negative.    Psychiatric/Behavioral: Negative.          Objective     Vital Signs (Most Recent)  Pulse: 95 (12/11/24 1428)  BP: 127/85 (12/11/24 1428)  5' (1.524 m)  80.6 kg (177 lb 11.1 oz)     Physical Exam:  Physical Exam  Vitals reviewed.   Constitutional:       General: She is not in acute distress.     Appearance: Normal appearance. She is well-developed. She is not ill-appearing.   HENT:      Head: Normocephalic and atraumatic.   Eyes:      General: No scleral icterus.     Conjunctiva/sclera: Conjunctivae normal.   Cardiovascular:      Rate and Rhythm: Normal rate and regular rhythm.      Heart sounds: Normal heart sounds.   Pulmonary:      Effort: Pulmonary effort is normal. No respiratory distress.      Breath sounds: Normal breath sounds.   Abdominal:      General: There is no distension.      Palpations: Abdomen is soft.      Tenderness: There is abdominal tenderness (mild ttp in epigastrium; prominent xiphoid). There is no guarding.   Musculoskeletal:         General: Normal range of motion.      Cervical back: Normal range of motion and neck supple.   Skin:     General: Skin is warm and dry.      Coloration: Skin is not jaundiced.   Neurological:      General: No focal deficit present.      Mental Status: She is alert and oriented to person, place, and time.   Psychiatric:         Mood and Affect: Mood normal.         Behavior: Behavior normal.       Diagnostic Results:  US: Reviewed  EGD: Reviewed  Gastric Emptying study: Reviewed  Esophagram: Reviewed  Bravo  study: Reviewed       Assessment and Plan   Patient is a 44 year-old woman with small hiatal hernia and post-prandial abdominal symptoms primarily epigastric pain, early satiety, weight loss, and bloating. Review of prior work-up noted cholelithiasis in 2012. UGI demonstrating 3 cm hiatal hernia with LA Grade A esophagitis. Bravo placement confirmed diagnosis of GERD. Esophagram confirmed small hiatal hernia and gastric emptying study without signs of delayed gastric emptying.    - Plan is for Laparoscopic hiatal hernia repair AND laparoscopic cholecystectomy   - Discussed risks, benefits, and alternative management. Patient would like to proceed with surgery  - Consent obtained in clinic  - Discussed expected postoperative recovery including recommendation to not return to work for 4-6 weeks      Rai Singleton  10/30/24

## 2024-12-12 RX ORDER — SODIUM CHLORIDE 9 MG/ML
INJECTION, SOLUTION INTRAVENOUS CONTINUOUS
OUTPATIENT
Start: 2024-12-12

## 2024-12-31 ENCOUNTER — TELEPHONE (OUTPATIENT)
Dept: SURGERY | Facility: CLINIC | Age: 44
End: 2024-12-31
Payer: MEDICAID

## 2024-12-31 NOTE — PRE-PROCEDURE INSTRUCTIONS
PreOp Instructions given:   - Verbal medication information (what to hold and what to take)   - NPO guidelines 2300  - Arrival place directions given; time to be given the day before procedure by the   Surgeon's Office DOSC  - Bathing with antibacterial soap   - Don't wear any jewelry or bring any valuables AM of surgery   - No makeup or moisturizer to face   - No perfume/cologne, powder, lotions or aftershave   Pt. verbalized understanding.   Pt denies any h/o Anesthesia/Sedation complications or side effects.   Patient does not know arrival time.  Explained that this information comes from the surgeon's office and if they haven't heard from them by 2 or 3 pm to call the office.  Patient stated an understanding.

## 2024-12-31 NOTE — TELEPHONE ENCOUNTER
I called and spoke to the Patient.  Arrival time of 0515 given to report to the Surgery Center on the 2nd floor of the Hospital on [facility].  She asked if she would be spending the night after surgery - Yes.  She did not have any other questions at present.

## 2025-01-02 ENCOUNTER — HOSPITAL ENCOUNTER (OUTPATIENT)
Facility: HOSPITAL | Age: 45
Discharge: HOME | End: 2025-01-03
Attending: SURGERY | Admitting: SURGERY
Payer: MEDICAID

## 2025-01-02 ENCOUNTER — ANESTHESIA (OUTPATIENT)
Dept: SURGERY | Facility: HOSPITAL | Age: 45
End: 2025-01-02
Payer: MEDICAID

## 2025-01-02 ENCOUNTER — ANESTHESIA EVENT (OUTPATIENT)
Dept: SURGERY | Facility: HOSPITAL | Age: 45
End: 2025-01-02
Payer: MEDICAID

## 2025-01-02 DIAGNOSIS — K80.20 CALCULUS OF GALLBLADDER WITHOUT CHOLECYSTITIS WITHOUT OBSTRUCTION: ICD-10-CM

## 2025-01-02 DIAGNOSIS — K21.00 HIATAL HERNIA WITH GERD AND ESOPHAGITIS: ICD-10-CM

## 2025-01-02 DIAGNOSIS — R11.10 REGURGITATION OF FOOD: ICD-10-CM

## 2025-01-02 DIAGNOSIS — K44.9 HIATAL HERNIA WITH GERD AND ESOPHAGITIS: ICD-10-CM

## 2025-01-02 LAB
B-HCG UR QL: NEGATIVE
CTP QC/QA: YES

## 2025-01-02 PROCEDURE — 25000242 PHARM REV CODE 250 ALT 637 W/ HCPCS: Performed by: STUDENT IN AN ORGANIZED HEALTH CARE EDUCATION/TRAINING PROGRAM

## 2025-01-02 PROCEDURE — 88304 TISSUE EXAM BY PATHOLOGIST: CPT | Mod: 26,,, | Performed by: STUDENT IN AN ORGANIZED HEALTH CARE EDUCATION/TRAINING PROGRAM

## 2025-01-02 PROCEDURE — 63600175 PHARM REV CODE 636 W HCPCS: Performed by: SURGERY

## 2025-01-02 PROCEDURE — 71000016 HC POSTOP RECOV ADDL HR: Performed by: SURGERY

## 2025-01-02 PROCEDURE — 25000003 PHARM REV CODE 250: Performed by: ANESTHESIOLOGY

## 2025-01-02 PROCEDURE — C1768 GRAFT, VASCULAR: HCPCS | Performed by: SURGERY

## 2025-01-02 PROCEDURE — 43280 LAPAROSCOPY FUNDOPLASTY: CPT | Mod: ,,, | Performed by: SURGERY

## 2025-01-02 PROCEDURE — 88304 TISSUE EXAM BY PATHOLOGIST: CPT | Performed by: STUDENT IN AN ORGANIZED HEALTH CARE EDUCATION/TRAINING PROGRAM

## 2025-01-02 PROCEDURE — 27201423 OPTIME MED/SURG SUP & DEVICES STERILE SUPPLY: Performed by: SURGERY

## 2025-01-02 PROCEDURE — 27000221 HC OXYGEN, UP TO 24 HOURS

## 2025-01-02 PROCEDURE — 71000015 HC POSTOP RECOV 1ST HR: Performed by: SURGERY

## 2025-01-02 PROCEDURE — 63600175 PHARM REV CODE 636 W HCPCS: Performed by: STUDENT IN AN ORGANIZED HEALTH CARE EDUCATION/TRAINING PROGRAM

## 2025-01-02 PROCEDURE — 94761 N-INVAS EAR/PLS OXIMETRY MLT: CPT

## 2025-01-02 PROCEDURE — 37000009 HC ANESTHESIA EA ADD 15 MINS: Performed by: SURGERY

## 2025-01-02 PROCEDURE — 36000711: Performed by: SURGERY

## 2025-01-02 PROCEDURE — 71000033 HC RECOVERY, INTIAL HOUR: Performed by: SURGERY

## 2025-01-02 PROCEDURE — 63600175 PHARM REV CODE 636 W HCPCS: Performed by: ANESTHESIOLOGY

## 2025-01-02 PROCEDURE — 37000008 HC ANESTHESIA 1ST 15 MINUTES: Performed by: SURGERY

## 2025-01-02 PROCEDURE — 94799 UNLISTED PULMONARY SVC/PX: CPT

## 2025-01-02 PROCEDURE — 36000710: Performed by: SURGERY

## 2025-01-02 PROCEDURE — 47562 LAPAROSCOPIC CHOLECYSTECTOMY: CPT | Mod: 51,,, | Performed by: SURGERY

## 2025-01-02 PROCEDURE — 25000003 PHARM REV CODE 250: Performed by: STUDENT IN AN ORGANIZED HEALTH CARE EDUCATION/TRAINING PROGRAM

## 2025-01-02 PROCEDURE — 99900035 HC TECH TIME PER 15 MIN (STAT)

## 2025-01-02 DEVICE — FELT TEFLON 1INX6IN: Type: IMPLANTABLE DEVICE | Site: ABDOMEN | Status: FUNCTIONAL

## 2025-01-02 RX ORDER — ROCURONIUM BROMIDE 10 MG/ML
INJECTION, SOLUTION INTRAVENOUS
Status: DISCONTINUED | OUTPATIENT
Start: 2025-01-02 | End: 2025-01-02

## 2025-01-02 RX ORDER — KETOROLAC TROMETHAMINE 30 MG/ML
INJECTION, SOLUTION INTRAMUSCULAR; INTRAVENOUS
Status: DISCONTINUED | OUTPATIENT
Start: 2025-01-02 | End: 2025-01-02

## 2025-01-02 RX ORDER — BUPIVACAINE HYDROCHLORIDE 2.5 MG/ML
INJECTION, SOLUTION EPIDURAL; INFILTRATION; INTRACAUDAL
Status: DISCONTINUED | OUTPATIENT
Start: 2025-01-02 | End: 2025-01-02 | Stop reason: HOSPADM

## 2025-01-02 RX ORDER — ONDANSETRON HYDROCHLORIDE 2 MG/ML
INJECTION, SOLUTION INTRAVENOUS
Status: DISCONTINUED | OUTPATIENT
Start: 2025-01-02 | End: 2025-01-02

## 2025-01-02 RX ORDER — CYCLOBENZAPRINE HCL 5 MG
5 TABLET ORAL 3 TIMES DAILY PRN
Status: DISCONTINUED | OUTPATIENT
Start: 2025-01-02 | End: 2025-01-03 | Stop reason: HOSPADM

## 2025-01-02 RX ORDER — SODIUM CHLORIDE 9 MG/ML
INJECTION, SOLUTION INTRAVENOUS CONTINUOUS
Status: DISCONTINUED | OUTPATIENT
Start: 2025-01-03 | End: 2025-01-02

## 2025-01-02 RX ORDER — PROPOFOL 10 MG/ML
VIAL (ML) INTRAVENOUS
Status: DISCONTINUED | OUTPATIENT
Start: 2025-01-02 | End: 2025-01-02

## 2025-01-02 RX ORDER — GLUCAGON 1 MG
1 KIT INJECTION
Status: DISCONTINUED | OUTPATIENT
Start: 2025-01-02 | End: 2025-01-02 | Stop reason: HOSPADM

## 2025-01-02 RX ORDER — SODIUM CHLORIDE 9 MG/ML
INJECTION, SOLUTION INTRAVENOUS CONTINUOUS
Status: DISCONTINUED | OUTPATIENT
Start: 2025-01-02 | End: 2025-01-02

## 2025-01-02 RX ORDER — LIDOCAINE HYDROCHLORIDE 20 MG/ML
INJECTION, SOLUTION EPIDURAL; INFILTRATION; INTRACAUDAL; PERINEURAL
Status: DISCONTINUED | OUTPATIENT
Start: 2025-01-02 | End: 2025-01-02

## 2025-01-02 RX ORDER — HYDROMORPHONE HYDROCHLORIDE 1 MG/ML
0.2 INJECTION, SOLUTION INTRAMUSCULAR; INTRAVENOUS; SUBCUTANEOUS EVERY 10 MIN PRN
Status: DISCONTINUED | OUTPATIENT
Start: 2025-01-02 | End: 2025-01-02 | Stop reason: HOSPADM

## 2025-01-02 RX ORDER — OXYCODONE HCL 5 MG/5 ML
10 SOLUTION, ORAL ORAL EVERY 4 HOURS PRN
Status: DISCONTINUED | OUTPATIENT
Start: 2025-01-02 | End: 2025-01-03

## 2025-01-02 RX ORDER — HALOPERIDOL 5 MG/ML
0.5 INJECTION INTRAMUSCULAR EVERY 10 MIN PRN
Status: DISCONTINUED | OUTPATIENT
Start: 2025-01-02 | End: 2025-01-02 | Stop reason: HOSPADM

## 2025-01-02 RX ORDER — GABAPENTIN 250 MG/5ML
250 SOLUTION ORAL EVERY 8 HOURS
Status: DISCONTINUED | OUTPATIENT
Start: 2025-01-02 | End: 2025-01-03 | Stop reason: HOSPADM

## 2025-01-02 RX ORDER — SODIUM CHLORIDE, SODIUM LACTATE, POTASSIUM CHLORIDE, CALCIUM CHLORIDE 600; 310; 30; 20 MG/100ML; MG/100ML; MG/100ML; MG/100ML
INJECTION, SOLUTION INTRAVENOUS CONTINUOUS
Status: DISCONTINUED | OUTPATIENT
Start: 2025-01-03 | End: 2025-01-03

## 2025-01-02 RX ORDER — ENOXAPARIN SODIUM 100 MG/ML
40 INJECTION SUBCUTANEOUS EVERY 24 HOURS
Status: DISCONTINUED | OUTPATIENT
Start: 2025-01-02 | End: 2025-01-03 | Stop reason: HOSPADM

## 2025-01-02 RX ORDER — ACETAMINOPHEN 650 MG/20.3ML
650 LIQUID ORAL EVERY 6 HOURS
Status: DISCONTINUED | OUTPATIENT
Start: 2025-01-02 | End: 2025-01-03 | Stop reason: HOSPADM

## 2025-01-02 RX ORDER — DEXMEDETOMIDINE HYDROCHLORIDE 100 UG/ML
INJECTION, SOLUTION INTRAVENOUS
Status: DISCONTINUED | OUTPATIENT
Start: 2025-01-02 | End: 2025-01-02

## 2025-01-02 RX ORDER — CEFAZOLIN 2 G/1
2 INJECTION, POWDER, FOR SOLUTION INTRAMUSCULAR; INTRAVENOUS
Status: COMPLETED | OUTPATIENT
Start: 2025-01-02 | End: 2025-01-02

## 2025-01-02 RX ORDER — SODIUM CHLORIDE, SODIUM LACTATE, POTASSIUM CHLORIDE, CALCIUM CHLORIDE 600; 310; 30; 20 MG/100ML; MG/100ML; MG/100ML; MG/100ML
INJECTION, SOLUTION INTRAVENOUS CONTINUOUS
Status: DISCONTINUED | OUTPATIENT
Start: 2025-01-02 | End: 2025-01-03

## 2025-01-02 RX ORDER — DEXAMETHASONE SODIUM PHOSPHATE 4 MG/ML
INJECTION, SOLUTION INTRA-ARTICULAR; INTRALESIONAL; INTRAMUSCULAR; INTRAVENOUS; SOFT TISSUE
Status: DISCONTINUED | OUTPATIENT
Start: 2025-01-02 | End: 2025-01-02

## 2025-01-02 RX ORDER — KETOROLAC TROMETHAMINE 15 MG/ML
15 INJECTION, SOLUTION INTRAMUSCULAR; INTRAVENOUS EVERY 8 HOURS PRN
Status: DISCONTINUED | OUTPATIENT
Start: 2025-01-02 | End: 2025-01-02 | Stop reason: HOSPADM

## 2025-01-02 RX ORDER — SODIUM CHLORIDE 0.9 % (FLUSH) 0.9 %
10 SYRINGE (ML) INJECTION
Status: DISCONTINUED | OUTPATIENT
Start: 2025-01-03 | End: 2025-01-03 | Stop reason: HOSPADM

## 2025-01-02 RX ORDER — OXYCODONE AND ACETAMINOPHEN 5; 325 MG/1; MG/1
1 TABLET ORAL
Status: DISCONTINUED | OUTPATIENT
Start: 2025-01-02 | End: 2025-01-02 | Stop reason: HOSPADM

## 2025-01-02 RX ORDER — OXYCODONE HCL 5 MG/5 ML
5 SOLUTION, ORAL ORAL EVERY 4 HOURS PRN
Status: DISCONTINUED | OUTPATIENT
Start: 2025-01-02 | End: 2025-01-03

## 2025-01-02 RX ORDER — KETAMINE HCL IN 0.9 % NACL 50 MG/5 ML
SYRINGE (ML) INTRAVENOUS
Status: DISCONTINUED | OUTPATIENT
Start: 2025-01-02 | End: 2025-01-02

## 2025-01-02 RX ORDER — MIDAZOLAM HYDROCHLORIDE 1 MG/ML
INJECTION INTRAMUSCULAR; INTRAVENOUS
Status: DISCONTINUED | OUTPATIENT
Start: 2025-01-02 | End: 2025-01-02

## 2025-01-02 RX ORDER — PHENYLEPHRINE HCL IN 0.9% NACL 1 MG/10 ML
SYRINGE (ML) INTRAVENOUS
Status: DISCONTINUED | OUTPATIENT
Start: 2025-01-02 | End: 2025-01-02

## 2025-01-02 RX ORDER — VASOPRESSIN 20 [USP'U]/ML
INJECTION, SOLUTION INTRAMUSCULAR; SUBCUTANEOUS
Status: DISCONTINUED | OUTPATIENT
Start: 2025-01-02 | End: 2025-01-02

## 2025-01-02 RX ORDER — CEFAZOLIN 2 G/1
2 INJECTION, POWDER, FOR SOLUTION INTRAMUSCULAR; INTRAVENOUS
Status: DISCONTINUED | OUTPATIENT
Start: 2025-01-02 | End: 2025-01-02

## 2025-01-02 RX ADMIN — VASOPRESSIN 3 UNITS: 20 INJECTION INTRAVENOUS at 10:01

## 2025-01-02 RX ADMIN — KETOROLAC TROMETHAMINE 30 MG: 30 INJECTION, SOLUTION INTRAMUSCULAR; INTRAVENOUS at 11:01

## 2025-01-02 RX ADMIN — ACETAMINOPHEN 650 MG: 650 SOLUTION ORAL at 12:01

## 2025-01-02 RX ADMIN — PROPOFOL 280 MG: 10 INJECTION, EMULSION INTRAVENOUS at 07:01

## 2025-01-02 RX ADMIN — ENOXAPARIN SODIUM 40 MG: 40 INJECTION SUBCUTANEOUS at 06:01

## 2025-01-02 RX ADMIN — Medication 100 MCG: at 09:01

## 2025-01-02 RX ADMIN — DEXMEDETOMIDINE 8 MCG: 200 INJECTION, SOLUTION INTRAVENOUS at 09:01

## 2025-01-02 RX ADMIN — SUGAMMADEX 200 MG: 100 INJECTION, SOLUTION INTRAVENOUS at 11:01

## 2025-01-02 RX ADMIN — ROCURONIUM BROMIDE 30 MG: 10 INJECTION, SOLUTION INTRAVENOUS at 10:01

## 2025-01-02 RX ADMIN — ROCURONIUM BROMIDE 60 MG: 10 INJECTION, SOLUTION INTRAVENOUS at 07:01

## 2025-01-02 RX ADMIN — Medication 100 MCG: at 08:01

## 2025-01-02 RX ADMIN — ROCURONIUM BROMIDE 20 MG: 10 INJECTION, SOLUTION INTRAVENOUS at 10:01

## 2025-01-02 RX ADMIN — SODIUM CHLORIDE, SODIUM GLUCONATE, SODIUM ACETATE, POTASSIUM CHLORIDE, MAGNESIUM CHLORIDE, SODIUM PHOSPHATE, DIBASIC, AND POTASSIUM PHOSPHATE: .53; .5; .37; .037; .03; .012; .00082 INJECTION, SOLUTION INTRAVENOUS at 07:01

## 2025-01-02 RX ADMIN — Medication 15 MG: at 09:01

## 2025-01-02 RX ADMIN — ROCURONIUM BROMIDE 40 MG: 10 INJECTION, SOLUTION INTRAVENOUS at 09:01

## 2025-01-02 RX ADMIN — OXYCODONE HYDROCHLORIDE 10 MG: 5 SOLUTION ORAL at 09:01

## 2025-01-02 RX ADMIN — GABAPENTIN 250 MG: 250 SOLUTION ORAL at 09:01

## 2025-01-02 RX ADMIN — KETOROLAC TROMETHAMINE 15 MG: 15 INJECTION, SOLUTION INTRAMUSCULAR; INTRAVENOUS at 01:01

## 2025-01-02 RX ADMIN — ONDANSETRON 4 MG: 2 INJECTION INTRAMUSCULAR; INTRAVENOUS at 11:01

## 2025-01-02 RX ADMIN — DEXMEDETOMIDINE 12 MCG: 200 INJECTION, SOLUTION INTRAVENOUS at 08:01

## 2025-01-02 RX ADMIN — GABAPENTIN 250 MG: 250 SOLUTION ORAL at 12:01

## 2025-01-02 RX ADMIN — Medication 200 MCG: at 09:01

## 2025-01-02 RX ADMIN — ACETAMINOPHEN 650 MG: 650 SOLUTION ORAL at 06:01

## 2025-01-02 RX ADMIN — HYDROMORPHONE HYDROCHLORIDE 0.2 MG: 1 INJECTION, SOLUTION INTRAMUSCULAR; INTRAVENOUS; SUBCUTANEOUS at 11:01

## 2025-01-02 RX ADMIN — MIDAZOLAM 2 MG: 1 INJECTION INTRAMUSCULAR; INTRAVENOUS at 07:01

## 2025-01-02 RX ADMIN — ACETAMINOPHEN 650 MG: 650 SOLUTION ORAL at 11:01

## 2025-01-02 RX ADMIN — CEFAZOLIN 2 G: 330 INJECTION, POWDER, FOR SOLUTION INTRAMUSCULAR; INTRAVENOUS at 08:01

## 2025-01-02 RX ADMIN — HYDROMORPHONE HYDROCHLORIDE 0.2 MG: 1 INJECTION, SOLUTION INTRAMUSCULAR; INTRAVENOUS; SUBCUTANEOUS at 01:01

## 2025-01-02 RX ADMIN — Medication 10 MG: at 10:01

## 2025-01-02 RX ADMIN — DEXMEDETOMIDINE 12 MCG: 200 INJECTION, SOLUTION INTRAVENOUS at 10:01

## 2025-01-02 RX ADMIN — DEXMEDETOMIDINE 12 MCG: 200 INJECTION, SOLUTION INTRAVENOUS at 07:01

## 2025-01-02 RX ADMIN — SODIUM CHLORIDE, POTASSIUM CHLORIDE, SODIUM LACTATE AND CALCIUM CHLORIDE: 600; 310; 30; 20 INJECTION, SOLUTION INTRAVENOUS at 01:01

## 2025-01-02 RX ADMIN — DEXAMETHASONE SODIUM PHOSPHATE 8 MG: 4 INJECTION INTRA-ARTICULAR; INTRALESIONAL; INTRAMUSCULAR; INTRAVENOUS; SOFT TISSUE at 08:01

## 2025-01-02 RX ADMIN — HYDROMORPHONE HYDROCHLORIDE 0.2 MG: 1 INJECTION, SOLUTION INTRAMUSCULAR; INTRAVENOUS; SUBCUTANEOUS at 12:01

## 2025-01-02 RX ADMIN — LIDOCAINE HYDROCHLORIDE 100 MG: 20 INJECTION, SOLUTION EPIDURAL; INFILTRATION; INTRACAUDAL at 07:01

## 2025-01-02 RX ADMIN — Medication 200 MCG: at 10:01

## 2025-01-02 RX ADMIN — CYCLOBENZAPRINE HYDROCHLORIDE 5 MG: 5 TABLET, FILM COATED ORAL at 04:01

## 2025-01-02 RX ADMIN — Medication 25 MG: at 07:01

## 2025-01-02 RX ADMIN — OXYCODONE HYDROCHLORIDE 10 MG: 5 SOLUTION ORAL at 02:01

## 2025-01-02 RX ADMIN — PROPOFOL 20 MG: 10 INJECTION, EMULSION INTRAVENOUS at 09:01

## 2025-01-02 NOTE — OP NOTE
DATE OF PROCEDURE: 1/2/2025    PRE OP DIAGNOSIS: Hiatal hernia with GERD and esophagitis [K44.9, K21.00]  Calculus of gallbladder without cholecystitis without obstruction [K80.20]    POST OP DIAGNOSIS: Hiatal hernia with GERD and esophagitis [K44.9, K21.00]  Calculus of gallbladder without cholecystitis without obstruction [K80.20]    PROCEDURE: Procedure(s) (LRB):  BLOCK, TRANSVERSUS ABDOMINIS PLANE (Bilateral)  REPAIR, HERNIA, HIATAL, LAPAROSCOPIC (N/A)  FUNDOPLICATION, LAPAROSCOPIC, TOUPET (N/A)  EGD (ESOPHAGOGASTRODUODENOSCOPY) (N/A)  CHOLECYSTECTOMY, LAPAROSCOPIC (N/A)    Surgeons and Role:     * Yamilet Rankin MD - Primary     * Chaitanya Jeter MD - Resident - Chief    ANESTHESIA: General    INDICATION: Patient is a 44 year-old obese woman who presented with heartburn, regurgitation, and postprandial RUQ abdominal pain with nausea and bloating. Work-up demonstrated a hiatal hernia with GERD with esophagitis and cholelithiasis without e/o cholecystitis or biliary obstruction. After discussing the risks, benefits and alternatives to laparoscopic hiatal hernia repair with partial posterior antireflux fundoplication and concomitant cholecystectomy, she elected to proceed with both under the same anesthetic. Informed consent was obtained.     FINDINGS:  1. Sliding hiatal hernia containing the proximal stomach and phrenoesophageal fat pad.  2. 6 cm of intra-abdominal esophagus following reduction.  3. Toupet fundoplication performed over 56Fr Bougie.  4. Identification and preservation of both the anterior and posterior vagal nerves.  5. Cholecystectomy performed after complete dissection of the critical view of safety.     PROCEDURE IN DETAIL: The patient was met in the pre-op area and her identity and consent confirmed. She was then brought to the Operating Room and placed in the supine position. General anesthesia was induced and she was intubated without incident. SCDs and a warming blanket  were placed and her legs abducted. Pre-op antibiotics were infused within 30 minutes of the incision. The abdomen was prepped and draped in sterile fashion and a pre-procedural pause performed by all members of the surgical and anesthesia teams. Access to the peritoneal cavity was gained via curvilinear infraumbilical incision using Montiel technique. Pneumoperitoneum to 15 mmHg with CO2 gas was obtained and inspection yielded no injury. Omental adhesions to the lower abdomen were noted. Two RUQ subcostal 5-mm trocars were placed under vision through which sufficient omental adhesions were divided to gain access to the left abdomen. Two LUQ subcostal 5-mm trocars were then placed in similar fashion. A liver retractor was placed through the right lateral trocar exposing the esophageal hiatus which noted a sliding-type hiatal hernia.The pars flaccida and short gastric vessels were divided sparing the left hepatic artery. Using judicial use of the Harmonic scalpel and blunt dissection, the hernia sac was divided from the crura and brought into the abdomen. The EGJ was controlled circumferentially with a Penrose drain. The mediastinal attachments to the esophagus were bluntly divided until the stomach and perigastric fat were fully reduced and the esophagus straigtened. The anterior and posterior vagus nerves were identified and preserved with the esophagus throughout. After dissection 6 cm of esophagus laid freely within the abdominal cavity without tension. The crural opening was medium in size and was reapproximated with two 0-Ticron pledgeted sutures posteriorly. A grasper could just be placed between the crural closure and esophagus. A 56-Fr Bougie was then passed through the EGJ. The fundus of the stomach was brought around the esophagus posteriorly and we checked for the angle of His and performed the shoe-shine maneuver. A posterior 270 degree Toupet fundoplication was performed with three 2-0 Ticron sutures on  either side taking a bite of esophagus off midline, avoiding the anterior vagus nerve, and a bite of fundus across the divided short gastrics along the greater curve. The Bougie and Penrose drain were removed. EGD noted an intact fundoplication with patent LES without twists or kinks. The air was aspirated and the endoscope removed. The stomach and fundoplication lay in the abdomen without tension or twisting. Hemostasis was ensured. The liver retractor was removed. A 5-mm subxiphoid trocar was placed under direct vision. The gallbladder was pulled up over the liver, exposing the triangle of Calot. Omental adhesions were stripped off the liver and gallbladder. The peritoneum was stripped off the base of the gallbladder, exposing the cystic duct and artery as they entered the gallbladder. The critical view was obtained demonstrating two and only two structures entering the gallbladder, with a view of the liver through the window both anterior and posteriorly, after 1/3 of the cystic plate was dissected. A stone was noted impacted in the neck of the gallbladder which was milked back into the fundus. A Hemo-meng clip was placed high on the specimen side of the cystic duct and artery and two Hemo-meng clips were placed on the stay-side; these were sharply divided in between leaving a small stump. The gallbladder was removed from the gallbladder bed using the hook cautery, placed in an EndoCatch bag, and removed from the abdomen through the umbilicus. The abdomen was inspected and hemostasis ensured. The previously placed clips were noted to be in excellent position. The trocars were removed after pneumoperitoneum was allowed to escape. The umbilical fascia was closed with a figure-of-eight 0-vicryl suture. The skin incisions were closed with 4-0 Monocryl and reinforced with Dermabond. The patient tolerated the operation well. She was extubated without incident and brought to the PACU in good condition. Sponge and needle  counts were correct at the end of the case.      EBL: 10 mL  Specimens: Gallbladder  Drains: None  Complications: None apparent  Dispo: Surgical floor     I was present and scrubbed for the entirety of this operation.     Yamilet Schmidt  1/2/2025

## 2025-01-02 NOTE — INTERVAL H&P NOTE
The patient has been examined and the H&P has been reviewed:    I concur with the findings and no changes have occurred since H&P was written.    Procedure risks, benefits and alternative options discussed and understood by patient/family.          Active Hospital Problems    Diagnosis  POA    *Hiatal hernia with GERD and esophagitis [K44.9, K21.00]  Yes      Resolved Hospital Problems   No resolved problems to display.

## 2025-01-02 NOTE — ANESTHESIA PREPROCEDURE EVALUATION
[facility]  Anesthesia Pre-Operative Evaluation     Patient Name: Mario Alberto Rader  YOB: 1980  MRN: [id number]  [facility]: [id number]       Admit Date: 1/2/2025   Admit Team: Networked reference to record PCT   Hospital Day: 1  Date of Procedure: 1/2/2025  Anesthesia: General Procedure: Procedure(s) (LRB):  REPAIR, HERNIA, HIATAL, LAPAROSCOPIC (N/A)  CHOLECYSTECTOMY, LAPAROSCOPIC (N/A)  FUNDOPLICATION, LAPAROSCOPIC, TOUPET (N/A)  Pre-Operative Diagnosis: Hiatal hernia with GERD and esophagitis [K44.9, K21.00]  Calculus of gallbladder without cholecystitis without obstruction [K80.20]  Proceduralist:Surgeons and Role:     * Yamilet Rankin MD - Primary  Code Status: Full Code   Advanced Directive: <no information>  Isolation Precautions: No active isolations  Capacity: Full capacity     SUBJECTIVE:   Mario Alberto Rader is a 44 y.o. female who  has a past medical history of Antral gastritis (09/13/2012), Asthma, Cholelithiasis (08/22/2012), Constipation, Herpes simplex type 2 infection (06/09/2021), Hiatal hernia (04/04/2024), Hypercholesterolemia (05/16/2022), and Hypertension.  No notes on file   0.9% NaCl   Intravenous Continuous         Hospital LOS: 0 days  ICU LOS: Patient does not have an ICU stay during this admission.    she has a current medication list which includes the following long-term medication(s): omeprazole.   Current Outpatient Medications   Medication Instructions    LINZESS 72 mcg, Oral, Before breakfast    omeprazole (PRILOSEC) 20 mg, Oral, Daily     ALLERGIES:   Review of patient's allergies indicates:  No Known Allergies  LDA:   AIRWAY:         * No LDAs found *      Lines/Drains/Airways       Peripheral Intravenous Line  Duration                  Peripheral IV - Single Lumen 01/02/25 0614 18 G Anterior;Left;Proximal Forearm <1 day                   Anesthesia Evaluation      Airway   Mallampati: III  TM distance: Normal  Neck ROM: Normal ROM  Dental    (+) Intact     Pulmonary    (+) asthma  Cardiovascular   (+) hypertension    Neuro/Psych    (+) neuromuscular disease    GI/Hepatic/Renal    (+) hiatal hernia, GERD    Endo/Other    Abdominal                    MEDICATIONS:     Current Outpatient Medications on File Prior to Encounter   Medication Sig Dispense Refill Last Dose/Taking    linaCLOtide (LINZESS) 72 mcg Cap capsule Take 1 capsule (72 mcg total) by mouth before breakfast. 90 capsule 3 12/26/2024    omeprazole (PRILOSEC) 20 MG capsule Take 1 capsule (20 mg total) by mouth once daily. (Patient taking differently: Take 20 mg by mouth daily as needed.) 90 capsule 3 12/26/2024      Inpatient Medications:  Antibiotics (From admission, onward)      Start     Stop Route Frequency Ordered    01/02/25 0529  ceFAZolin 2 g         -- IV On Call Procedure 01/02/25 0529          VTE Risk Mitigation (From admission, onward)           Ordered     IP VTE LOW RISK PATIENT  Once         01/02/25 0529     Place sequential compression device  Until discontinued         01/02/25 0529                      Current Facility-Administered Medications   Medication Dose Route Frequency Provider Last Rate Last Admin    0.9% NaCl infusion   Intravenous Continuous Yamilet Rankin MD        ceFAZolin 2 g  2 g Intravenous On Call Procedure Yamilet Rankin MD              History:     Active Hospital Problems    Diagnosis  POA    *Hiatal hernia with GERD and esophagitis [K44.9, K21.00]  Yes      Resolved Hospital Problems   No resolved problems to display.     Surgical History:    has a past surgical history that includes Tubal ligation (01/01/2005); [place] tooth extraction; Esophagogastroduodenoscopy (N/A, 4/4/2024); Esophagogastroduodenoscopy (N/A, 11/25/2024); and ph monitoring, esophagus, wireless, (off reflux meds) (N/A, 11/25/2024).   Social History:    reports being sexually active and has had partner(s) who are male.  reports that she has quit smoking. Her smoking use  "included cigarettes. She has never used smokeless tobacco. She reports that she does not currently use alcohol. She reports current drug use. Drug: Marijuana.    Vitals:    01/02/25 0606 01/02/25 0608   BP: (!) 182/98    BP Location: Right arm    Patient Position: Lying    Pulse: 80 78   Resp: 20    SpO2: 100%    Weight: 79.6 kg (175 lb 7.8 oz)    Height: 5' (1.524 m)      Vital Signs Range (Last 24H):  Pulse:  [78-80]   Resp:  [20]   BP: (182)/(98)   SpO2:  [100 %]     Body mass index is 34.27 kg/m².  Wt Readings from Last 4 Encounters:   01/02/25 79.6 kg (175 lb 7.8 oz)   12/11/24 80.6 kg (177 lb 11.1 oz)   11/25/24 82.6 kg (182 lb)   11/06/24 82.6 kg (182 lb)        Intake/Output - Last 3 Shifts       None          Lab Results   Component Value Date    WBC 4.58 12/11/2024    HGB 13.2 12/11/2024    HCT 40.0 12/11/2024     12/11/2024     12/11/2024    K 3.7 12/11/2024     12/11/2024    CREATININE 0.8 12/11/2024    BUN 5 (L) 12/11/2024    CO2 26 12/11/2024    GLU 83 12/11/2024    CALCIUM 9.2 12/11/2024    ALKPHOS 45 (L) 05/16/2022    ALT 46 (H) 05/16/2022    AST 20 05/16/2022    ALBUMIN 4.2 05/16/2022    HGBA1C 4.5 05/16/2022    TROPONINI <0.006 05/06/2021    BNP <10 05/16/2022     Recent Results (from the past 12 hours)   POCT urine pregnancy    Collection Time: 01/02/25  6:09 AM   Result Value Ref Range    POC Preg Test, Ur Negative Negative     Acceptable Yes      No results for input(s): "WBC", "HGB", "HCT", "PLT", "NA", "K", "CREATININE", "GLU", "INR", "LACTATE", "5HIAAPLASMA", "5HIAAURINT", "5HIAA", "1BTRE12JR" in the last 168 hours.  No LMP recorded.    EKG:   Results for orders placed or performed during the hospital encounter of 05/06/21   EKG 12-lead    Collection Time: 05/06/21 11:32 AM    Narrative    Test Reason : R07.9,    Vent. Rate : 088 BPM     Atrial Rate : 088 BPM     P-R Int : 172 ms          QRS Dur : 070 ms      QT Int : 354 ms       P-R-T Axes : 034 031 018 " degrees     QTc Int : 428 ms    Normal sinus rhythm  Possible Left atrial enlargement  Borderline Abnormal ECG  When compared with ECG of 25-AUG-2000 16:38,  No significant change was found  Confirmed by MERLYN MILES, MARYANN ([id number]) on 5/6/2021 4:00:38 PM    Referred By: CANDACE   SELF           Confirmed By:MARYANN ZULETA MD     TTE:  No results found for this or any previous visit.    SHARA:  No results found for this or any previous visit.    Stress Test:  No results found for this or any previous visit.    No results found for this or any previous visit.    LHC:  No results found for this or any previous visit.    Cardiac Device Check   No results found for this or any previous visit.    No results found for this or any previous visit.      Pre-op Assessment          Review of Systems  Cardiovascular:     Hypertension                                    Hypertension         Pulmonary:    Asthma       Asthma:               Hepatic/GI:    Hiatal Hernia, GERD         Gerd    Hernia, Hiatal Hernia      Neurological:    Neuromuscular Disease,                                 Neuromuscular Disease       Physical Exam  General: Well nourished    Airway:  Mallampati: III / II  Mouth Opening: Normal  TM Distance: Normal  Tongue: Normal  Neck ROM: Normal ROM    Dental:  Intact        Anesthesia Plan  Type of Anesthesia, risks & benefits discussed:    Anesthesia Type: Gen ETT, Gen Natural Airway, MAC  Intra-op Monitoring Plan: Standard ASA Monitors  Post Op Pain Control Plan: multimodal analgesia and IV/PO Opioids PRN  Induction:  IV  Airway Plan: Direct and Video, Post-Induction  Informed Consent: Informed consent signed with the Patient and all parties understand the risks and agree with anesthesia plan.  All questions answered.   ASA Score: 2  Day of Surgery Review of History & Physical: H&P completed by Anesthesiologist.  Anesthesia Plan Notes: Chart reviewed, patient interviewed and examined.  The anesthetic plan was  explained.  Risks, benefits, and alternatives were discussed. Questions were answered and the consent was signed.        JOSE Davila M.D.         Ready For Surgery From Anesthesia Perspective.     .

## 2025-01-02 NOTE — NURSING TRANSFER
Nursing Transfer Note      1/2/2025   1:19 PM    Nurse giving handoff: Miguel A ARAIZA PACU  Nurse receiving handoff: Pennie ARAIZA POSS    Reason patient is being transferred: post surgery/anethesia    Transfer To: 553    Transfer via bed    Transfer with IV pump/fluid LR at 75cc/hr    Transported by PCT x2    Transfer Vital Signs:  Please see flow sheet    Telemetry: Box Number N/A  Order for Tele Monitor? No    Additional Lines: purewick    Medicines sent: phenergan bag    Any special needs or follow-up needed: routine    Patient belongings transferred with patient: No    Chart send with patient: Yes    Notified: family via surgical texting system     Patient reassessed at: 1/2/2025 at 1330  1  Upon arrival to floor: cardiac monitor applied, patient oriented to room, and bed in lowest position

## 2025-01-02 NOTE — ANESTHESIA POSTPROCEDURE EVALUATION
Anesthesia Post Evaluation    Patient: Mario Alberto Rader    Procedure(s) Performed: Procedure(s) (LRB):  REPAIR, HERNIA, HIATAL, LAPAROSCOPIC (N/A)  CHOLECYSTECTOMY, LAPAROSCOPIC (N/A)  FUNDOPLICATION, LAPAROSCOPIC, TOUPET (N/A)  BLOCK, TRANSVERSUS ABDOMINIS PLANE  EGD (ESOPHAGOGASTRODUODENOSCOPY) (N/A)    Final Anesthesia Type: general      Patient location during evaluation: PACU  Patient participation: Yes- Able to Participate  Level of consciousness: awake and alert  Post-procedure vital signs: reviewed and stable  Pain management: adequate  Airway patency: patent    PONV status at discharge: No PONV  Anesthetic complications: no      Cardiovascular status: blood pressure returned to baseline  Respiratory status: unassisted  Hydration status: euvolemic  Follow-up not needed.              Vitals Value Taken Time   /62 01/02/25 1121   Temp 37 01/02/25 1127   Pulse 77 01/02/25 1126   Resp 28 01/02/25 1126   SpO2 100 % 01/02/25 1126   Vitals shown include unfiled device data.      No case tracking events are documented in the log.      Pain/Jesus Manuel Score: No data recorded

## 2025-01-02 NOTE — ANESTHESIA PROCEDURE NOTES
Intubation    Date/Time: 1/2/2025 7:49 AM    Performed by: Laisha Davila MD  Authorized by: Laisha Davila MD    Intubation:     Induction:  Rapid sequence induction    Intubated:  Postinduction    Mask Ventilation:  Not attempted    Attempts:  1    Attempted By:  Staff anesthesiologist    Method of Intubation:  Direct    Blade:  Frey 2    Laryngeal View Grade: Grade I - full view of cords      Difficult Airway Encountered?: No      Complications:  None    Airway Device:  Oral endotracheal tube    Airway Device Size:  7.5    Style/Cuff Inflation:  Cuffed    Inflation Amount (mL):  7    Tube secured:  19    Secured at:  The teeth    Placement Verified By:  Capnometry    Complicating Factors:  None    Findings Post-Intubation:  BS equal bilateral and atraumatic/condition of teeth unchanged

## 2025-01-02 NOTE — TRANSFER OF CARE
Anesthesia Transfer of Care Note    Patient: Mario Alberto Rader    Procedure(s) Performed: Procedure(s) (LRB):  REPAIR, HERNIA, HIATAL, LAPAROSCOPIC (N/A)  CHOLECYSTECTOMY, LAPAROSCOPIC (N/A)  FUNDOPLICATION, LAPAROSCOPIC, TOUPET (N/A)  BLOCK, TRANSVERSUS ABDOMINIS PLANE  EGD (ESOPHAGOGASTRODUODENOSCOPY) (N/A)    Patient location: PACU    Anesthesia Type: general    Transport from OR: Transported from OR on 6-10 L/min O2 by face mask with adequate spontaneous ventilation    Post pain: adequate analgesia    Post assessment: no apparent anesthetic complications    Post vital signs: stable    Level of consciousness: awake and oriented    Nausea/Vomiting: no nausea/vomiting    Complications: none    Transfer of care protocol was followed      Last vitals: Visit Vitals  BP (!) 182/98 (BP Location: Right arm, Patient Position: Lying)   Pulse 78   Resp 20   Ht 5' (1.524 m)   Wt 79.6 kg (175 lb 7.8 oz)   SpO2 100%   Breastfeeding No   BMI 34.27 kg/m²

## 2025-01-02 NOTE — BRIEF OP NOTE
[facility] (Formerly [facility])  Brief Operative Note    SUMMARY     Surgery Date: 1/2/2025     Surgeons and Role:     * Yamilet Rankin MD - Primary     * Chaitanya Jeter MD - Resident - Chief    Assisting Surgeon: None    Pre-op Diagnosis:  Hiatal hernia with GERD and esophagitis [K44.9, K21.00]  Calculus of gallbladder without cholecystitis without obstruction [K80.20]    Post-op Diagnosis:  Post-Op Diagnosis Codes:     * Hiatal hernia with GERD and esophagitis [K44.9, K21.00]     * Calculus of gallbladder without cholecystitis without obstruction [K80.20]    Procedure(s) (LRB):  REPAIR, HERNIA, HIATAL, LAPAROSCOPIC (N/A)  CHOLECYSTECTOMY, LAPAROSCOPIC (N/A)  FUNDOPLICATION, LAPAROSCOPIC, TOUPET (N/A)  BLOCK, TRANSVERSUS ABDOMINIS PLANE  EGD (ESOPHAGOGASTRODUODENOSCOPY) (N/A)    Anesthesia: General    Implants:  Implant Name Type Inv. Item Serial No.  Lot No. LRB No. Used Action   FELT SLAVA [id number] - [id number]  [employer] [id number]  [employer] [place] [id number]  1 Implanted       Operative Findings: See Op Note    Estimated Blood Loss: * No values recorded between 1/2/2025  8:08 AM and 1/2/2025 11:16 AM *    Estimated Blood Loss has been documented.         Specimens:   Specimen (24h ago, onward)       Start     Ordered    01/02/25 1058  Specimen to Pathology, Surgery General Surgery  Once        Comments: Pre-op Diagnosis: Hiatal hernia with GERD and esophagitis [K44.9, K21.00]Calculus of gallbladder without cholecystitis without obstruction [K80.20]Procedure(s):REPAIR, HERNIA, HIATAL, LAPAROSCOPICCHOLECYSTECTOMY, LAPAROSCOPICFUNDOPLICATION, LAPAROSCOPIC, TOUPETBLOCK, TRANSVERSUS ABDOMINIS PLANE Number of specimens: 1Name of specimens: 1. Gallbladder-perm     References:    Click here for ordering Quick Tip   Question Answer Comment   Procedure Type: General Surgery    Specimen Class: Routine/Screening    Release to patient Immediate        01/02/25 1058                    [id number]

## 2025-01-03 VITALS
RESPIRATION RATE: 19 BRPM | TEMPERATURE: 97 F | HEIGHT: 60 IN | SYSTOLIC BLOOD PRESSURE: 186 MMHG | HEART RATE: 85 BPM | DIASTOLIC BLOOD PRESSURE: 92 MMHG | WEIGHT: 175.5 LBS | BODY MASS INDEX: 34.46 KG/M2 | OXYGEN SATURATION: 99 %

## 2025-01-03 LAB
ANION GAP SERPL CALC-SCNC: 9 MMOL/L (ref 8–16)
BUN SERPL-MCNC: 5 MG/DL (ref 6–20)
CALCIUM SERPL-MCNC: 8.8 MG/DL (ref 8.7–10.5)
CHLORIDE SERPL-SCNC: 106 MMOL/L (ref 95–110)
CO2 SERPL-SCNC: 21 MMOL/L (ref 23–29)
CREAT SERPL-MCNC: 0.7 MG/DL (ref 0.5–1.4)
ERYTHROCYTE [DISTWIDTH] IN BLOOD BY AUTOMATED COUNT: 12.3 % (ref 11.5–14.5)
EST. GFR  (NO RACE VARIABLE): >60 ML/MIN/1.73 M^2
FINAL PATHOLOGIC DIAGNOSIS: NORMAL
GLUCOSE SERPL-MCNC: 111 MG/DL (ref 70–110)
GROSS: NORMAL
HCT VFR BLD AUTO: 41.7 % (ref 37–48.5)
HGB BLD-MCNC: 14 G/DL (ref 12–16)
Lab: NORMAL
MAGNESIUM SERPL-MCNC: 2 MG/DL (ref 1.6–2.6)
MCH RBC QN AUTO: 33.2 PG (ref 27–31)
MCHC RBC AUTO-ENTMCNC: 33.6 G/DL (ref 32–36)
MCV RBC AUTO: 99 FL (ref 82–98)
PHOSPHATE SERPL-MCNC: 3 MG/DL (ref 2.7–4.5)
PLATELET # BLD AUTO: 283 K/UL (ref 150–450)
PMV BLD AUTO: 9.5 FL (ref 9.2–12.9)
POTASSIUM SERPL-SCNC: 3.8 MMOL/L (ref 3.5–5.1)
RBC # BLD AUTO: 4.22 M/UL (ref 4–5.4)
SODIUM SERPL-SCNC: 136 MMOL/L (ref 136–145)
WBC # BLD AUTO: 9.16 K/UL (ref 3.9–12.7)

## 2025-01-03 PROCEDURE — 36415 COLL VENOUS BLD VENIPUNCTURE: CPT | Performed by: STUDENT IN AN ORGANIZED HEALTH CARE EDUCATION/TRAINING PROGRAM

## 2025-01-03 PROCEDURE — 85027 COMPLETE CBC AUTOMATED: CPT | Performed by: STUDENT IN AN ORGANIZED HEALTH CARE EDUCATION/TRAINING PROGRAM

## 2025-01-03 PROCEDURE — 84100 ASSAY OF PHOSPHORUS: CPT | Performed by: STUDENT IN AN ORGANIZED HEALTH CARE EDUCATION/TRAINING PROGRAM

## 2025-01-03 PROCEDURE — 80048 BASIC METABOLIC PNL TOTAL CA: CPT | Performed by: STUDENT IN AN ORGANIZED HEALTH CARE EDUCATION/TRAINING PROGRAM

## 2025-01-03 PROCEDURE — 83735 ASSAY OF MAGNESIUM: CPT | Performed by: STUDENT IN AN ORGANIZED HEALTH CARE EDUCATION/TRAINING PROGRAM

## 2025-01-03 PROCEDURE — 63600175 PHARM REV CODE 636 W HCPCS: Performed by: STUDENT IN AN ORGANIZED HEALTH CARE EDUCATION/TRAINING PROGRAM

## 2025-01-03 PROCEDURE — 25000242 PHARM REV CODE 250 ALT 637 W/ HCPCS: Performed by: STUDENT IN AN ORGANIZED HEALTH CARE EDUCATION/TRAINING PROGRAM

## 2025-01-03 PROCEDURE — 25000003 PHARM REV CODE 250: Performed by: STUDENT IN AN ORGANIZED HEALTH CARE EDUCATION/TRAINING PROGRAM

## 2025-01-03 RX ORDER — ACETAMINOPHEN 500MG/15ML
650 LIQUID (ML) ORAL EVERY 6 HOURS
Qty: 500 ML | Refills: 0 | Status: SHIPPED | OUTPATIENT
Start: 2025-01-03

## 2025-01-03 RX ORDER — GABAPENTIN 250 MG/5ML
250 SOLUTION ORAL EVERY 8 HOURS
Qty: 100 ML | Refills: 0 | Status: SHIPPED | OUTPATIENT
Start: 2025-01-03

## 2025-01-03 RX ORDER — METHOCARBAMOL 100 MG/ML
500 INJECTION, SOLUTION INTRAMUSCULAR; INTRAVENOUS EVERY 8 HOURS
Status: DISCONTINUED | OUTPATIENT
Start: 2025-01-03 | End: 2025-01-03

## 2025-01-03 RX ORDER — ONDANSETRON 4 MG/1
8 TABLET, ORALLY DISINTEGRATING ORAL EVERY 6 HOURS
Qty: 20 TABLET | Refills: 0 | Status: SHIPPED | OUTPATIENT
Start: 2025-01-03

## 2025-01-03 RX ORDER — ONDANSETRON HYDROCHLORIDE 2 MG/ML
4 INJECTION, SOLUTION INTRAVENOUS EVERY 6 HOURS
Status: DISCONTINUED | OUTPATIENT
Start: 2025-01-03 | End: 2025-01-03 | Stop reason: HOSPADM

## 2025-01-03 RX ORDER — OXYCODONE HCL 5 MG/5 ML
5 SOLUTION, ORAL ORAL EVERY 4 HOURS PRN
Status: DISCONTINUED | OUTPATIENT
Start: 2025-01-03 | End: 2025-01-03 | Stop reason: HOSPADM

## 2025-01-03 RX ORDER — OXYCODONE HCL 5 MG/5 ML
5 SOLUTION, ORAL ORAL EVERY 4 HOURS PRN
Qty: 25 ML | Refills: 0 | Status: SHIPPED | OUTPATIENT
Start: 2025-01-03

## 2025-01-03 RX ADMIN — OXYCODONE HYDROCHLORIDE 10 MG: 5 SOLUTION ORAL at 05:01

## 2025-01-03 RX ADMIN — ACETAMINOPHEN 650 MG: 650 SOLUTION ORAL at 05:01

## 2025-01-03 RX ADMIN — ONDANSETRON 4 MG: 2 INJECTION INTRAMUSCULAR; INTRAVENOUS at 11:01

## 2025-01-03 RX ADMIN — ACETAMINOPHEN 650 MG: 650 SOLUTION ORAL at 11:01

## 2025-01-03 RX ADMIN — GABAPENTIN 250 MG: 250 SOLUTION ORAL at 05:01

## 2025-01-03 RX ADMIN — GABAPENTIN 250 MG: 250 SOLUTION ORAL at 01:01

## 2025-01-03 NOTE — PLAN OF CARE
Pt AAOx4 and VSS . Progressing with plan of care. Free of skin breakdown as the pt positioned/repositioned well independently. Clean, dry, and intact dressing noted on abdomen. Ambulated to bathroom and void without difficulty. Tolerated with Clear liquid diet. SCDs in place at all times. Incentive spirometer at bedside and pt instructed on its use. Pain controlled well with PRN meds. Frequent rounds made to assess pain and safety and no complaints at this time noted. Side rails up x 2. Bed locked. Call light within reach. No falls noted. Will continue to monitor.    Problem: Adult Inpatient Plan of Care  Goal: Plan of Care Review  Outcome: Progressing  Goal: Patient-Specific Goal (Individualized)  Outcome: Progressing  Goal: Absence of Hospital-Acquired Illness or Injury  Outcome: Progressing  Goal: Optimal Comfort and Wellbeing  Outcome: Progressing  Goal: Readiness for Transition of Care  Outcome: Progressing     Problem: Wound  Goal: Optimal Coping  Outcome: Progressing  Goal: Optimal Functional Ability  Outcome: Progressing  Goal: Absence of Infection Signs and Symptoms  Outcome: Progressing  Goal: Improved Oral Intake  Outcome: Progressing  Goal: Optimal Pain Control and Function  Outcome: Progressing  Goal: Skin Health and Integrity  Outcome: Progressing  Goal: Optimal Wound Healing  Outcome: Progressing     Problem: Skin Injury Risk Increased  Goal: Skin Health and Integrity  Outcome: Progressing     Problem: Fall Injury Risk  Goal: Absence of Fall and Fall-Related Injury  Outcome: Progressing

## 2025-01-03 NOTE — PROGRESS NOTES
[facility]  General Surgery  Progress Note    Subjective:     History of Present Illness:  No notes on file    Post-Op Info:  Procedure(s) (LRB):  REPAIR, HERNIA, HIATAL, LAPAROSCOPIC (N/A)  CHOLECYSTECTOMY, LAPAROSCOPIC (N/A)  FUNDOPLICATION, LAPAROSCOPIC, TOUPET (N/A)  BLOCK, TRANSVERSUS ABDOMINIS PLANE  EGD (ESOPHAGOGASTRODUODENOSCOPY) (N/A)   1 Day Post-Op     Interval History: NAEON. Tolerating liquids without nausea or vomiting. Reporting most pain at umbilicus site.     Medications:  Continuous Infusions:   lactated ringers   Intravenous Continuous        lactated ringers   Intravenous Continuous 75 mL/hr at 01/02/25 1303 New Bag at 01/02/25 1303     Scheduled Meds:   acetaminophen  650 mg Oral Q6H    enoxparin  40 mg Subcutaneous Q24H (prophylaxis, 1700)    gabapentin  250 mg Oral Q8H     PRN Meds:  Current Facility-Administered Medications:     cyclobenzaprine, 5 mg, Oral, TID PRN    oxyCODONE, 10 mg, Oral, Q4H PRN    oxyCODONE, 5 mg, Oral, Q4H PRN    promethazine (PHENERGAN) 6.25 mg in 0.9% NaCl 50 mL IVPB, 6.25 mg, Intravenous, Q6H PRN    sodium chloride 0.9%, 10 mL, Intravenous, PRN     Review of patient's allergies indicates:  No Known Allergies  Objective:     Vital Signs (Most Recent):  Temp: 96.6 °F (35.9 °C) (01/03/25 0625)  Pulse: 75 (01/03/25 0625)  Resp: 18 (01/03/25 0625)  BP: (!) 182/98 (01/03/25 0625)  SpO2: 97 % (01/03/25 0625) Vital Signs (24h Range):  Temp:  [96.6 °F (35.9 °C)-98.4 °F (36.9 °C)] 96.6 °F (35.9 °C)  Pulse:  [72-86] 75  Resp:  [14-29] 18  SpO2:  [93 %-100 %] 97 %  BP: ()/() 182/98     Weight: 79.6 kg (175 lb 7.8 oz)  Body mass index is 34.27 kg/m².    Intake/Output - Last 3 Shifts         01/01 0700  01/02 0659 01/02 0700  01/03 0659    I.V. (mL/kg)  1750 (22)    Total Intake(mL/kg)  1750 (22)    Urine (mL/kg/hr)  1360 (0.7)    Total Output  1360    Net  +390                   Physical Exam  Constitutional:       General: She is not in acute distress.      Appearance: Normal appearance.   HENT:      Head: Normocephalic and atraumatic.   Eyes:      General: No scleral icterus.     Conjunctiva/sclera: Conjunctivae normal.      Pupils: Pupils are equal, round, and reactive to light.   Neck:      Trachea: No tracheal deviation.   Cardiovascular:      Rate and Rhythm: Normal rate and regular rhythm.   Pulmonary:      Effort: Pulmonary effort is normal. No respiratory distress.      Breath sounds: No stridor.   Abdominal:      General: Abdomen is flat. There is no distension.      Palpations: Abdomen is soft.      Tenderness: There is no abdominal tenderness. There is no guarding.      Comments: Soft, appropriately tender  Port site incisions with Dermabond c/d/i   Musculoskeletal:         General: No deformity or signs of injury. Normal range of motion.      Cervical back: No edema.   Skin:     General: Skin is warm and dry.      Coloration: Skin is not jaundiced.   Neurological:      General: No focal deficit present.      Mental Status: She is alert and oriented to person, place, and time.   Psychiatric:         Mood and Affect: Mood normal.         Behavior: Behavior normal.          Significant Labs:  I have reviewed all pertinent lab results within the past 24 hours.  CBC:   Recent Labs   Lab 01/03/25  0349   WBC 9.16   RBC 4.22   HGB 14.0   HCT 41.7      MCV 99*   MCH 33.2*   MCHC 33.6     BMP:   Recent Labs   Lab 01/03/25  0349   *      K 3.8      CO2 21*   BUN 5*   CREATININE 0.7   CALCIUM 8.8   MG 2.0       Significant Diagnostics:  I have reviewed all pertinent imaging results/findings within the past 24 hours.  Assessment/Plan:     * Hiatal hernia with GERD and esophagitis  Mario Alberto Rader is a 44 y.o. female with PMHx of hiatal hernia now s/p lap hiatal hernia repair and cholecystectomy on 1/2/25.    - tolerating CLD, will likely advance to FLD today  - CRUSH or open all meds. Liquid or crushed meds only. No pills  - scheduled  anti-emetics with PRN breakthrough  - MMPC  - home meds started as appropriate  - OOB, ambulate in halls  - encourage IS  - DVT ppx    Dispo: possible PM discharge pending tolerating FLD            Burke Elena MD  General Surgery  [facility]

## 2025-01-03 NOTE — ASSESSMENT & PLAN NOTE
Mario Alberto Rader is a 44 y.o. female with PMHx of hiatal hernia now s/p lap hiatal hernia repair and cholecystectomy on 1/2/25.    - tolerating CLD, will likely advance to FLD today  - CRUSH or open all meds. Liquid or crushed meds only. No pills  - scheduled anti-emetics with PRN breakthrough  - MMPC  - home meds started as appropriate  - OOB, ambulate in halls  - encourage IS  - DVT ppx    Dispo: possible PM discharge pending tolerating FLD

## 2025-01-03 NOTE — PLAN OF CARE
[facility]  Discharge Final Note    Primary Care Provider: Marla Arreola MD    Expected Discharge Date: 1/3/2025    Final Discharge Note (most recent)       Final Note - 01/03/25 1515          Final Note    Assessment Type Final Discharge Note     Anticipated Discharge Disposition Home or Self Care     Hospital Resources/Appts/Education Provided Provided patient/caregiver with written discharge plan information;Provided education on problems/symptoms using teachback                   Future Appointments   Date Time Provider Department Center   1/15/2025  1:30 PM Yamilet Rankin MD [facility]

## 2025-01-03 NOTE — SUBJECTIVE & OBJECTIVE
Interval History: NAEON. Tolerating liquids without nausea or vomiting. Reporting most pain at umbilicus site.     Medications:  Continuous Infusions:   lactated ringers   Intravenous Continuous        lactated ringers   Intravenous Continuous 75 mL/hr at 01/02/25 1303 New Bag at 01/02/25 1303     Scheduled Meds:   acetaminophen  650 mg Oral Q6H    enoxparin  40 mg Subcutaneous Q24H (prophylaxis, 1700)    gabapentin  250 mg Oral Q8H     PRN Meds:  Current Facility-Administered Medications:     cyclobenzaprine, 5 mg, Oral, TID PRN    oxyCODONE, 10 mg, Oral, Q4H PRN    oxyCODONE, 5 mg, Oral, Q4H PRN    promethazine (PHENERGAN) 6.25 mg in 0.9% NaCl 50 mL IVPB, 6.25 mg, Intravenous, Q6H PRN    sodium chloride 0.9%, 10 mL, Intravenous, PRN     Review of patient's allergies indicates:  No Known Allergies  Objective:     Vital Signs (Most Recent):  Temp: 96.6 °F (35.9 °C) (01/03/25 0625)  Pulse: 75 (01/03/25 0625)  Resp: 18 (01/03/25 0625)  BP: (!) 182/98 (01/03/25 0625)  SpO2: 97 % (01/03/25 0625) Vital Signs (24h Range):  Temp:  [96.6 °F (35.9 °C)-98.4 °F (36.9 °C)] 96.6 °F (35.9 °C)  Pulse:  [72-86] 75  Resp:  [14-29] 18  SpO2:  [93 %-100 %] 97 %  BP: ()/() 182/98     Weight: 79.6 kg (175 lb 7.8 oz)  Body mass index is 34.27 kg/m².    Intake/Output - Last 3 Shifts         01/01 0700  01/02 0659 01/02 0700 01/03 0659    I.V. (mL/kg)  1750 (22)    Total Intake(mL/kg)  1750 (22)    Urine (mL/kg/hr)  1360 (0.7)    Total Output  1360    Net  +390                   Physical Exam  Constitutional:       General: She is not in acute distress.     Appearance: Normal appearance.   HENT:      Head: Normocephalic and atraumatic.   Eyes:      General: No scleral icterus.     Conjunctiva/sclera: Conjunctivae normal.      Pupils: Pupils are equal, round, and reactive to light.   Neck:      Trachea: No tracheal deviation.   Cardiovascular:      Rate and Rhythm: Normal rate and regular rhythm.   Pulmonary:      Effort:  Pulmonary effort is normal. No respiratory distress.      Breath sounds: No stridor.   Abdominal:      General: Abdomen is flat. There is no distension.      Palpations: Abdomen is soft.      Tenderness: There is no abdominal tenderness. There is no guarding.      Comments: Soft, appropriately tender  Port site incisions with Dermabond c/d/i   Musculoskeletal:         General: No deformity or signs of injury. Normal range of motion.      Cervical back: No edema.   Skin:     General: Skin is warm and dry.      Coloration: Skin is not jaundiced.   Neurological:      General: No focal deficit present.      Mental Status: She is alert and oriented to person, place, and time.   Psychiatric:         Mood and Affect: Mood normal.         Behavior: Behavior normal.          Significant Labs:  I have reviewed all pertinent lab results within the past 24 hours.  CBC:   Recent Labs   Lab 01/03/25  0349   WBC 9.16   RBC 4.22   HGB 14.0   HCT 41.7      MCV 99*   MCH 33.2*   MCHC 33.6     BMP:   Recent Labs   Lab 01/03/25  0349   *      K 3.8      CO2 21*   BUN 5*   CREATININE 0.7   CALCIUM 8.8   MG 2.0       Significant Diagnostics:  I have reviewed all pertinent imaging results/findings within the past 24 hours.

## 2025-01-03 NOTE — DISCHARGE INSTRUCTIONS
Post-op instructions:    - No dressing needed. Keep abdominal binder in place at all times. OK to remove for bathing.  - OK for full liquid diet for five days. Can begin soft diet on 1/08/25  - No driving while taking narcotic medication  - No lifting more than 15 pounds for 4 weeks  - OK to shower with warm soap and water. No soaking in any water  - Clinic visit to be scheduled in two weeks

## 2025-01-03 NOTE — DISCHARGE SUMMARY
[facility] - Surgery  General Surgery  Discharge Summary      Patient Name: Mario Alberto Rader  MRN: [id number]  Admission Date: 1/2/2025  Hospital Length of Stay: 0 days  Discharge Date and Time:  01/03/2025 12:08 PM  Attending Physician: Yamilet Rankin   Discharging Provider: Chaitanya Jeter MD  Primary Care Provider: Marla Arreola MD     HPI:          Attestation signed by Yamilet Rankin MD at 12/12/2024  1:58 PM     I have seen the patient, reviewed the Resident's history and physical, assessment, and plan. I have personally interviewed and examined the patient at bedside and agree with the findings.      44 obese female returns to discuss interval work-up and surgery:     GES 12/3/24: At 3 hours the percentage of retention is 6% (normal gastric emptying time)     Bravo pH 96Hr 12/3/24: Positive for GERD; no correlation between acid reflux events and symptoms recorded; DeMeester score 36.5     EGD 11/25/24: 3-cm hiatal hernia, LA grade A esophagitis, normal duodenum, EndoFLIP negative for EGJOO; path: antrum with reactive/chemical gastropathy     UGI 11/6/24: Small to moderate hiatal hernia; one episode of spontaneous reflux observed     EGD 4/4/24: Small hiatal hernia, antral erythema, normal duodenum; path negative for celiac disease or EoE     EGD 9/13/12: Few non-bleeding superficial gastric ulcers without bleeding in antrum; path: mild chronic gastritis, HP-     Abd US 8/22/12: Cholelithiasis without evidence of cholecystitis     She continues to c/o occasional postprandial RUQ abdominal pain with nausea and bloating. She also endorses worse heartburn since stopping her PPI for her recent studies as well as more frequent regurgitation especially while wearing a waist .     We discussed the anatomy and natural course of hiatal hernias and indications for repair. We discussed the technical and convalescent aspects of laparoscopic hiatal hernia repair with antireflux fundoplication as  well as the risks, benefits and alternatives, the risks including but not limited to bleeding, infection, injury to bowel or spleen, esophageal or gastric perforation, vagal nerve injury which can result in gastroparesis or dumping syndrome, pneumothorax, dysphagia, and recurrence of hiatal hernia and/or GERD. All questions were answered to her satisfaction and she has elected to proceed with laparoscopic hiatal hernia repair with Toupet fundoplication.      We also discussed the technical and convalescent aspects of cholecystectomy, as well as the risks benefits and alternatives, the risks including but not limited to bleeding, infection, injury to bowel, liver or bile duct which may require further operation, bile leak or retained CBD stone which may require procedural intervention, post-cholecystectomy pancreatitis or diarrhea, and a 1-2% risk of conversion to open. All questions were answered to her satisfaction she has elected to proceed with laparoscopic cholecystectomy.      Informed consent was obtained for concomitant laparoscopic cholecystectomy and hiatal hernia repair with Toupet fundoplication.     Yamilet Schmidt MD  General Surgery  [facility] 2nd Fl            Expand All Collapse All    History & Physical        Subjective  History of Present Illness:  Patient is a 44 year-old obese woman with PMH HTN, DLD, and chronic constipation who presents for evaluation of epigastric pain and known hiatal hernia. She endorses mild symptoms of heartburn since around 2005 that over the last 2-3 years have progressively worsened. She describes frequent postprandial epigastric pain, fullness, and early satiety, with associated heartburn. She has difficulty maintaining normal bowel movements alternating between constipation and diarrhea for which she takes Linzess. She suffers from frequent bloating worse after eating. She has lost approx 20 lbs due to decreased oral intake. She has some  incomplete improvement with nightly Prilosec.     Interval history 12/11/24:  Patient presents for follow up after receiving workup with EGD with bravo and esophagram. Has been off of Prilosec since EGD. States her heartburn has worsened as a result. Is using a a waist- which gives her some relief.  some symptoms have worsened, especially while eating rice. Also with some RUQ pain after eating. Patient works as a      GERD Questionnaire  Meds: Linzess 72mcg, has been off of Prilosec since EGD   + Typical heartburn, worsened since stopping omeprazole  + Regurgitation  + Dysphagia solids, started 2-3 years ago    - Dysphagia liquids  + Hoarseness  - Sore throat  + Cough  - Asthma  + Chest pain, radiating to shoulder   + Water brash, occasionally   - Globus  + Nausea  + Vomiting           Procedure(s) (LRB):  REPAIR, HERNIA, HIATAL, LAPAROSCOPIC (N/A)  CHOLECYSTECTOMY, LAPAROSCOPIC (N/A)  FUNDOPLICATION, LAPAROSCOPIC, TOUPET (N/A)  BLOCK, TRANSVERSUS ABDOMINIS PLANE  EGD (ESOPHAGOGASTRODUODENOSCOPY) (N/A)     Hospital Course: Mario Alberto Rader presented to [facility] [place], [place] on the morning of 1/2/2025 for the procedure listed above. The procedure was performed without complication and she was transferred to the floor for further post op care and monitoring. Their postoperative course was uneventful and she progressed according to plan.  Her pain was controlled with a combination of IV and PO narcotic pain medications. Her diet has been advanced throughout her hospital stay. She is now ambulating without assistance, tolerating a full liquid diet, pain is well controlled with PO pain medication, and she is voiding appropriately. She now meets all criteria for discharge.     Consults:   Consults (From admission, onward)          Status Ordering Provider     Inpatient consult to Registered Dietitian/Nutritionist  Once        Provider:  (Not yet assigned)    Acknowledged PETE BLUE            Significant Diagnostic  Studies: N/A    Pending Diagnostic Studies:       Procedure Component Value Units Date/Time    Specimen to Pathology, Surgery General Surgery [[id number]] Collected: 01/02/25 1058    Order Status: Sent Lab Status: In process Updated: 01/02/25 1325    Specimen: Tissue           Final Active Diagnoses:    Diagnosis Date Noted POA    PRINCIPAL PROBLEM:  Hiatal hernia with GERD and esophagitis [K44.9, K21.00] 03/26/2024 Yes      Problems Resolved During this Admission:      Discharged Condition: good    Disposition: Home or Self Care    Follow Up:    Patient Instructions:      Diet full liquid for 1 week   Order Comments: No carbonated beverages     Lifting restrictions - no greater than 10 lbs     Follow up with surgeon in two weeks     Leave dressing on - Keep it clean, dry, and intact until clinic visit     Advance diet as tolerated to 1 week of post-Nissen diet after 1 week of full liquid diet   Order Comments: No carbonated beverages     Notify your health care provider if you experience any of the following:  temperature >100.4     Notify your health care provider if you experience any of the following:  persistent nausea and vomiting or diarrhea     Notify your health care provider if you experience any of the following:  severe uncontrolled pain     Notify your health care provider if you experience any of the following:  redness, tenderness, or signs of infection (pain, swelling, redness, odor or green/yellow discharge around incision site)     Notify your health care provider if you experience any of the following:  difficulty breathing or increased cough     Notify your health care provider if you experience any of the following:  persistent dizziness, light-headedness, or visual disturbances     No driving until:   Order Comments: No Driving while taking narcotic pain medication     Notify your health care provider if you experience any of the following:  temperature >100.4     Notify your health care  provider if you experience any of the following:  persistent nausea and vomiting or diarrhea     Notify your health care provider if you experience any of the following:  severe uncontrolled pain     Notify your health care provider if you experience any of the following:  redness, tenderness, or signs of infection (pain, swelling, redness, odor or green/yellow discharge around incision site)     No dressing needed     Activity as tolerated     Shower on day dressing removed (No bath)     Medications:  Reconciled Home Medications:      Medication List        START taking these medications      acetaminophen 650 mg/20.3 mL Soln  Commonly known as: TYLENOL  Take 20.3 mLs (650 mg total) by mouth every 6 (six) hours.     gabapentin 250 mg/5 mL (5 mL) solution  Commonly known as: NEURONTIN  Take 5 mLs (250 mg total) by mouth every 8 (eight) hours.     ondansetron 4 MG Tbdl  Commonly known as: ZOFRAN-ODT  Take 2 tablets (8 mg total) by mouth every 6 (six) hours.     oxyCODONE 5 mg/5 mL Soln  Commonly known as: ROXICODONE  Take 5 mLs (5 mg total) by mouth every 4 (four) hours as needed.            CONTINUE taking these medications      LINZESS 72 mcg Cap capsule  Generic drug: linaCLOtide  Take 1 capsule (72 mcg total) by mouth before breakfast.            STOP taking these medications      omeprazole 20 MG capsule  Commonly known as: PRILOSEC              Chaitanya Jeter MD  General Surgery  [facility] - Surgery

## 2025-01-03 NOTE — CONSULTS
Food & Nutrition  Education    Diet Education: Post-op Gastric diet  Time Spent: 10 minutes  Learners: patient and caregivers    Nutrition Education provided with handouts: Diet After Gastric Surgery    Comments: Pt agreed to review education materials - encouraged full liquid diet x 7 days followed by soft food diet x 7 days, per MD's request. Discussed ways to prevent stomach stretching and excess gas to avoid possible complications. All questions and concerns answered. Dietitian's contact information provided.     Follow-Up: Yes    Please Re-consult as needed.    Thanks!    Luisana Shelton, MS, RD, LDN

## 2025-01-15 ENCOUNTER — OFFICE VISIT (OUTPATIENT)
Dept: SURGERY | Facility: CLINIC | Age: 45
End: 2025-01-15
Payer: MEDICAID

## 2025-01-15 VITALS
HEIGHT: 60 IN | DIASTOLIC BLOOD PRESSURE: 67 MMHG | WEIGHT: 170 LBS | HEART RATE: 98 BPM | BODY MASS INDEX: 33.38 KG/M2 | SYSTOLIC BLOOD PRESSURE: 114 MMHG

## 2025-01-15 DIAGNOSIS — Z87.19 HISTORY OF REPAIR OF HIATAL HERNIA: Primary | ICD-10-CM

## 2025-01-15 DIAGNOSIS — Z90.49 S/P LAPAROSCOPIC CHOLECYSTECTOMY: ICD-10-CM

## 2025-01-15 DIAGNOSIS — Z98.890 HISTORY OF REPAIR OF HIATAL HERNIA: Primary | ICD-10-CM

## 2025-01-15 PROBLEM — R10.13 EPIGASTRIC PAIN: Status: RESOLVED | Noted: 2024-03-26 | Resolved: 2025-01-15

## 2025-01-15 PROBLEM — K44.9 HIATAL HERNIA WITH GERD AND ESOPHAGITIS: Status: RESOLVED | Noted: 2024-03-26 | Resolved: 2025-01-15

## 2025-01-15 PROBLEM — K21.00 HIATAL HERNIA WITH GERD AND ESOPHAGITIS: Status: RESOLVED | Noted: 2024-03-26 | Resolved: 2025-01-15

## 2025-01-15 PROCEDURE — 99999 PR PBB SHADOW E&M-EST. PATIENT-LVL III: CPT | Mod: PBBFAC,,, | Performed by: SURGERY

## 2025-01-15 PROCEDURE — 3078F DIAST BP <80 MM HG: CPT | Mod: CPTII,,, | Performed by: SURGERY

## 2025-01-15 PROCEDURE — 99213 OFFICE O/P EST LOW 20 MIN: CPT | Mod: PBBFAC | Performed by: SURGERY

## 2025-01-15 PROCEDURE — 1159F MED LIST DOCD IN RCRD: CPT | Mod: CPTII,,, | Performed by: SURGERY

## 2025-01-15 PROCEDURE — 3074F SYST BP LT 130 MM HG: CPT | Mod: CPTII,,, | Performed by: SURGERY

## 2025-01-15 PROCEDURE — 1160F RVW MEDS BY RX/DR IN RCRD: CPT | Mod: CPTII,,, | Performed by: SURGERY

## 2025-01-15 PROCEDURE — 99024 POSTOP FOLLOW-UP VISIT: CPT | Mod: ,,, | Performed by: SURGERY

## 2025-01-15 NOTE — PROGRESS NOTES
"[facility]  Post Op Visit    SUBJECTIVE:  Mario Alberto Rader is a 44 y.o. female who presents to clinic today for post op follow up after Repair, Hernia, Hiatal, Laparoscopic, Cholecystectomy, Laparoscopic, Fundoplication, Laparoscopic, Toupet, Block, Transversus Abdominis Plane, and Egd (esophagogastroduodenoscopy) on 1/2/2025 . She  denies pain, fevers, chills, nausea, vomiting, diarrhea, and constipation and is returning to their usual activity level. She is tolerating soft diet with normal appetite and bowel function and denies redness around incisions. She has some minimal oozing from umbilical incision. She reports feeling "more gas" however this is improving with time.    OBJECTIVE:  Vitals:    01/15/25 1352   BP: 114/67   Pulse: 98     Body mass index is 33.2 kg/m².    General: female in NAD. Not toxic appearing.   HENT: Normocephalic and atraumatic. EOM intact.   Pulmonary: No respiratory distress. Effort normal.  Cardiovascular: Regular rate.   Abdomen: soft, non-tender, non-distended  Skin: Incisions are healing well without signs of infection. No erythema, or increased warmth noted. Some dried blood near umbilical site  MSK: normal range of motion  Neurological: No focal deficit present; alert and oriented to person, place, and time.  Psychiatric: normal mood and behavior      Path:   Lab Results   Component Value Date    FPATHDX  01/02/2025     Gallbladder, cholecystectomy:   Minimal chronic cholecystitis with cholelithiasis, negative for atypia or malignancy           ASSESSMENT/PLAN:    Mario Alberto Rader is a 44 y.o. y/o female who presents to clinic today for f/u s/p Repair, Hernia, Hiatal, Laparoscopic, Cholecystectomy, Laparoscopic, Fundoplication, Laparoscopic, Toupet, Block, Transversus Abdominis Plane, and Egd (esophagogastroduodenoscopy) on 1/2/2025. Clinically improving and meeting all post op milestones     - okay to start regular diet  - Patient is advised to avoid heavy lifting or " strenuous activity for another 4 weeks.   - Path reviewed  - Follow-up PRN. Call clinic with any questions or concerns  - All questions answered; patient is comfortable with the above follow-up plan and verbalized understanding.    Burke Elena M.D.  General Surgery PGY-III  [facility]

## 2025-02-24 ENCOUNTER — PATIENT MESSAGE (OUTPATIENT)
Dept: ADMINISTRATIVE | Facility: OTHER | Age: 45
End: 2025-02-24
Payer: MEDICAID

## (undated) DEVICE — SHEARS HARMONIC 5CM 36CM

## (undated) DEVICE — SUT TICRON BLUE 2-0 48 SK

## (undated) DEVICE — BAG TISS RETRV MONARCH 10MM

## (undated) DEVICE — TUBING HF INSUFFLATION W/ FLTR

## (undated) DEVICE — SUT MCRYL PLUS 4-0 PS2 27IN

## (undated) DEVICE — KIT ANTIFOG W/SPONG & FLUID

## (undated) DEVICE — CLIP HEMO-LOK ML

## (undated) DEVICE — DRAPE CORETEMP FLD WRM 56X62IN

## (undated) DEVICE — SCISSOR 5MMX35CM DIRECT DRIVE

## (undated) DEVICE — DRAIN PENROSE XRAY 12 X 1/4 ST

## (undated) DEVICE — GOWN POLY REINF BRTH SLV XL

## (undated) DEVICE — TRAY MINOR GEN SURG OMC

## (undated) DEVICE — ADHESIVE DERMABOND ADVANCED

## (undated) DEVICE — CANNULA ENDOPATH XCEL 5X100MM

## (undated) DEVICE — SUT ENDOLOOP PDSII 18 LIGA

## (undated) DEVICE — ELECTRODE REM PLYHSV RETURN 9

## (undated) DEVICE — BLADE SURG CARBON STEEL SZ11

## (undated) DEVICE — TROCAR ENDOPATH XCEL 12X100MM

## (undated) DEVICE — DRAPE ABDOMINAL TIBURON 14X11

## (undated) DEVICE — SUT VICRYL+ 27 UR-6 VIOL

## (undated) DEVICE — NDL HYPO REG 25G X 1 1/2

## (undated) DEVICE — GOWN POLY REINF BRTH SLV LG

## (undated) DEVICE — LEGGING CLEAR POLY 2/PACK

## (undated) DEVICE — SUT TICRON BLUE O SK

## (undated) DEVICE — TROCAR ENDOPATH XCEL 5X100MM

## (undated) DEVICE — TROCAR ENDOPATH XCEL 11MM 10CM